# Patient Record
Sex: FEMALE | Race: WHITE | NOT HISPANIC OR LATINO | Employment: OTHER | URBAN - METROPOLITAN AREA
[De-identification: names, ages, dates, MRNs, and addresses within clinical notes are randomized per-mention and may not be internally consistent; named-entity substitution may affect disease eponyms.]

---

## 2017-01-09 RX ORDER — CLOPIDOGREL BISULFATE 75 MG/1
TABLET ORAL
Qty: 90 TABLET | Refills: 0 | Status: SHIPPED | OUTPATIENT
Start: 2017-01-09 | End: 2017-04-18 | Stop reason: SDUPTHER

## 2017-02-27 ENCOUNTER — PATIENT OUTREACH (OUTPATIENT)
Dept: ADMINISTRATIVE | Facility: HOSPITAL | Age: 77
End: 2017-02-27

## 2017-02-27 NOTE — PROGRESS NOTES
PRE-VISIT CHART REVIEW    Appointment Scheduled on 3/14/17    Department stratifications & guidelines reviewed:yes    Target Chronic Diagnosis: DM, HTN, obesity    Chronic Diagnosis Intervention Due: yes    Goals Updated:Yes    Health Maintenance Due   Topic Date Due    Fecal Occult Blood Test (FOBT)  11/05/2015    Eye Exam  10/05/2016    Mammogram  10/29/2016    Foot Exam  12/04/2016    Hemoglobin A1c  03/06/2017       Advanced Directives:   65 years of age or older?  Yes  Directive on file?  no                                      Pre-visit patient communication: 02/27/2017 MyChart/Letter    Studies or screenings scheduled pre-visit: yes    Educate patient on obesity (30.74)

## 2017-02-27 NOTE — LETTER
February 27, 2017    Linda Her  [address] [facility] [address]             [facility]  [address]  [facility] [address]  Phone: [phone] Dear Mrs. Her:    [facility] is committed to your overall health.  To help you get the most out of each of your visits, we will review your information to make sure you are up to date on all of your recommended tests and/or procedures.      Dr. Smitha Rivas has found that you may be due for your annual diabetic eye and foot exams, mammogram, and colon cancer screening (stool cards).     If you have had any of the above done at another facility, please bring the records or information with you so that your record at [facility] will be complete.    If you are currently taking medication, please bring it with you to your appointment for review.    Also, if you have any type of Advanced Directives, please bring them with you to your office visit so we may scan them into your chart.    If you have any questions or concerns, please don't hesitate to call.    Thank you for letting us care for you,  Cassi Styles LPN Clinical Care Coordinator  [facility] [place]  [phone]

## 2017-03-13 RX ORDER — AMIODARONE HYDROCHLORIDE 200 MG/1
TABLET ORAL
Qty: 60 TABLET | Refills: 0 | Status: SHIPPED | OUTPATIENT
Start: 2017-03-13 | End: 2017-03-14 | Stop reason: SDUPTHER

## 2017-03-14 ENCOUNTER — TELEPHONE (OUTPATIENT)
Dept: FAMILY MEDICINE | Facility: CLINIC | Age: 77
End: 2017-03-14

## 2017-03-14 ENCOUNTER — OFFICE VISIT (OUTPATIENT)
Dept: FAMILY MEDICINE | Facility: CLINIC | Age: 77
End: 2017-03-14
Payer: MEDICARE

## 2017-03-14 VITALS
HEART RATE: 67 BPM | OXYGEN SATURATION: 98 % | SYSTOLIC BLOOD PRESSURE: 136 MMHG | RESPIRATION RATE: 18 BRPM | DIASTOLIC BLOOD PRESSURE: 64 MMHG | TEMPERATURE: 98 F | WEIGHT: 160.5 LBS | BODY MASS INDEX: 30.3 KG/M2 | HEIGHT: 61 IN

## 2017-03-14 DIAGNOSIS — E11.69 HYPERLIPIDEMIA ASSOCIATED WITH TYPE 2 DIABETES MELLITUS: ICD-10-CM

## 2017-03-14 DIAGNOSIS — E11.8 TYPE 2 DIABETES MELLITUS WITH COMPLICATION, UNSPECIFIED LONG TERM INSULIN USE STATUS: Primary | ICD-10-CM

## 2017-03-14 DIAGNOSIS — E78.5 HYPERLIPIDEMIA ASSOCIATED WITH TYPE 2 DIABETES MELLITUS: ICD-10-CM

## 2017-03-14 DIAGNOSIS — I70.1 RENAL ARTERY STENOSIS: ICD-10-CM

## 2017-03-14 DIAGNOSIS — Z12.31 VISIT FOR SCREENING MAMMOGRAM: ICD-10-CM

## 2017-03-14 DIAGNOSIS — I15.2 HYPERTENSION ASSOCIATED WITH DIABETES: ICD-10-CM

## 2017-03-14 DIAGNOSIS — E03.4 HYPOTHYROIDISM DUE TO ACQUIRED ATROPHY OF THYROID: ICD-10-CM

## 2017-03-14 DIAGNOSIS — E11.59 HYPERTENSION ASSOCIATED WITH DIABETES: ICD-10-CM

## 2017-03-14 DIAGNOSIS — I65.23 CAROTID STENOSIS, BILATERAL: Primary | ICD-10-CM

## 2017-03-14 LAB
ALBUMIN SERPL BCP-MCNC: 3.9 G/DL
ALP SERPL-CCNC: 85 U/L
ALT SERPL W/O P-5'-P-CCNC: 11 U/L
ANION GAP SERPL CALC-SCNC: 12 MMOL/L
AST SERPL-CCNC: 16 U/L
BILIRUB SERPL-MCNC: 0.6 MG/DL
BUN SERPL-MCNC: 20 MG/DL
CALCIUM SERPL-MCNC: 9.7 MG/DL
CHLORIDE SERPL-SCNC: 106 MMOL/L
CHOLEST/HDLC SERPL: 3.7 {RATIO}
CO2 SERPL-SCNC: 24 MMOL/L
CREAT SERPL-MCNC: 0.9 MG/DL
EST. GFR  (AFRICAN AMERICAN): >60 ML/MIN/1.73 M^2
EST. GFR  (NON AFRICAN AMERICAN): >60 ML/MIN/1.73 M^2
GLUCOSE SERPL-MCNC: 119 MG/DL
HDL/CHOLESTEROL RATIO: 27.2 %
HDLC SERPL-MCNC: 162 MG/DL
HDLC SERPL-MCNC: 44 MG/DL
LDLC SERPL CALC-MCNC: 95.6 MG/DL
NONHDLC SERPL-MCNC: 118 MG/DL
POTASSIUM SERPL-SCNC: 4.1 MMOL/L
PROT SERPL-MCNC: 7.8 G/DL
SODIUM SERPL-SCNC: 142 MMOL/L
T4 FREE SERPL-MCNC: 1.89 NG/DL
TRIGL SERPL-MCNC: 112 MG/DL
TSH SERPL DL<=0.005 MIU/L-ACNC: 0.09 UIU/ML

## 2017-03-14 PROCEDURE — 80061 LIPID PANEL: CPT

## 2017-03-14 PROCEDURE — 84439 ASSAY OF FREE THYROXINE: CPT

## 2017-03-14 PROCEDURE — 99214 OFFICE O/P EST MOD 30 MIN: CPT | Mod: S$GLB,,, | Performed by: FAMILY MEDICINE

## 2017-03-14 PROCEDURE — 80053 COMPREHEN METABOLIC PANEL: CPT

## 2017-03-14 PROCEDURE — 83036 HEMOGLOBIN GLYCOSYLATED A1C: CPT

## 2017-03-14 PROCEDURE — 36415 COLL VENOUS BLD VENIPUNCTURE: CPT | Mod: S$GLB,,, | Performed by: FAMILY MEDICINE

## 2017-03-14 PROCEDURE — 84443 ASSAY THYROID STIM HORMONE: CPT

## 2017-03-14 NOTE — PROGRESS NOTES
Subjective:       Patient ID: Linda Her is a 76 y.o. female.    Chief Complaint: Follow-up    HPI   The patient is a 76-year-old who is here today for chronic follow-up.  She is accompanied today by her son.  She has not yet had her labs but would like to get them done today.  Today we discussed the followin)  diabetes.  Her fasting morning sugar readings are usually in the  range.  Her sugars have not been above 120.  She denies any hypoglycemic episodes and denies any polyuria, polydipsia or polyphagia  2)  hypertension.  She's currently taking lisinopril 20 once a day, Norvasc 5 mg once a day and metoprolol 25 mg twice a day.  She has not felt as if her blood pressure has been high or low.  She does not check her blood pressure outside of the office  3)  hypothyroidism.  She is doing well with her current dose of Synthroid.  She is due for a TSH.  Of note, she is on amiodarone   4)  hyperlipidemia.  She is doing with her Lipitor.  She is fasting today for labs  5)  PAF.  She denies any palpitations.  She's previously not been able to tell when she has been in A. fib.  She is taking Plavix and amiodarone and metoprolol    Review of Systems   Constitutional: Negative for appetite change, chills, diaphoresis, fatigue, fever and unexpected weight change.   HENT: Negative for congestion, ear pain, postnasal drip, rhinorrhea, sinus pressure, sneezing, sore throat and trouble swallowing.    Eyes: Negative for pain, discharge and visual disturbance.   Respiratory: Negative for cough, chest tightness, shortness of breath and wheezing.    Cardiovascular: Negative for chest pain, palpitations and leg swelling.   Gastrointestinal: Negative for abdominal distention, abdominal pain, blood in stool, constipation, diarrhea, nausea and vomiting.   Skin: Negative for rash.       Objective:      Physical Exam   Constitutional: She is oriented to person, place, and time. She appears well-developed and  well-nourished. No distress.   HENT:   Head: Normocephalic and atraumatic.   Right Ear: Hearing, tympanic membrane, external ear and ear canal normal.   Left Ear: Hearing, tympanic membrane, external ear and ear canal normal.   Nose: Nose normal.   Mouth/Throat: Oropharynx is clear and moist and mucous membranes are normal. No oral lesions. No oropharyngeal exudate, posterior oropharyngeal edema or posterior oropharyngeal erythema.   Eyes: Conjunctivae, EOM and lids are normal. Pupils are equal, round, and reactive to light. No scleral icterus.   Positive ptosis involving the left eye.  Positive strabismus involving the left eye   Neck: Normal range of motion. Neck supple. Carotid bruit is present (bilaterally). No thyroid mass and no thyromegaly present.   The right side of the neck reveals a well healed right-sided CEA incision   Cardiovascular: Normal rate and regular rhythm.   No extrasystoles are present. PMI is not displaced.  Exam reveals no gallop.    Murmur heard.   Systolic murmur is present with a grade of 4/6   Pulses:       Dorsalis pedis pulses are 2+ on the right side, and 2+ on the left side.        Posterior tibial pulses are 2+ on the right side, and 2+ on the left side.   Pulmonary/Chest: Effort normal and breath sounds normal. No accessory muscle usage. No respiratory distress.   Clear to auscultation bilaterally.   Abdominal: Soft. Normal appearance and bowel sounds are normal. She exhibits no abdominal bruit. There is no hepatosplenomegaly. There is no tenderness. There is no rebound.   Musculoskeletal:        Right foot: There is no deformity.        Left foot: There is no deformity.   Feet:   Right Foot:   Protective Sensation: 10 sites tested. 10 sites sensed.   Skin Integrity: Positive for warmth. Negative for ulcer or callus.   Left Foot:   Protective Sensation: 10 sites tested. 10 sites sensed.   Skin Integrity: Positive for warmth. Negative for ulcer or callus.   Lymphadenopathy:         "Head (right side): No submental and no submandibular adenopathy present.        Head (left side): No submental and no submandibular adenopathy present.        Right cervical: No superficial cervical, no deep cervical and no posterior cervical adenopathy present.       Left cervical: No superficial cervical, no deep cervical and no posterior cervical adenopathy present.        Right: No supraclavicular adenopathy present.        Left: No supraclavicular adenopathy present.   Neurological: She is alert and oriented to person, place, and time.   Skin: Skin is warm, dry and intact.   Psychiatric: She has a normal mood and affect.     Blood pressure 136/64, pulse 67, temperature 97.7 °F (36.5 °C), resp. rate 18, height 5' 1" (1.549 m), weight 72.8 kg (160 lb 7.9 oz), SpO2 98 %.Body mass index is 30.33 kg/(m^2).        A/P:  1)  diabetes.  Status unknown.  We will check an A1c today.  We will arrange diabetic eye exam  2)  hypertension.  Well-controlled.  Continue current medication.  Her blood pressure should be less than 139/89  3)  hypothyroidism.  Status unknown.  We will check a TSH today   4)  hyperlipidemia.  Status unknown.  We will check fasting labs today  5)  PAF.  Asymptomatic.  Continue current medication.  She appears due for follow up with cardiology in June and we will ask them to schedule this   6)  PAD.  Status unknown.  She appears to for vascular follow-up and I'll ask them to schedule this  7)  health maintenance issues.  We will schedule her mammogram    As long as she does well, I will see her back in 6 months or sooner if needed  "

## 2017-03-14 NOTE — MR AVS SNAPSHOT
[facility]  [address] [address] [address]  [facility] [address]  Phone: [phone]  Fax: [phone]                  Linda Her   3/14/2017 9:50 AM   Office Visit    Description:  Female : 1940   Provider:  Smitha Rivas MD   Department:  [facility]           Reason for Visit     Follow-up           Diagnoses this Visit        Comments    Type 2 diabetes mellitus with complication, unspecified long term insulin use status    -  Primary     Hypothyroidism due to acquired atrophy of thyroid         Hyperlipidemia associated with type 2 diabetes mellitus         Hypertension associated with diabetes         Visit for screening mammogram         Encounter for colorectal cancer screening         Abnormal laboratory test result                To Do List           Future Appointments        Provider Department Dept Phone    3/18/2017 8:45 AM [facility] [id number] [facility] [phone]    2017 11:05 AM LAB, [place] [facility] [phone]    2017 1:20 PM Sierra Gooden MD [facility] - Hematology [phone]    2017 10:30 AM Smitha Rivas MD [facility] [phone]      Goals (5 Years of Data)              Today    16    Blood Pressure < 140/90   136/64  136/64      BMI is less than 25           HEMOGLOBIN A1C < 7.0       6.5      [facility] On Call     [facility] Care [facility] -  Assistance  Registered nurses in the [facility] provide clinical advisement, health education, appointment booking, and other advisory services.  Call for this free service at [phone].             Medications           Message regarding Medications     Verify the changes and/or additions to your medication regime listed below are the same as discussed with your clinician today.  If any of these changes or additions are incorrect, please notify your healthcare provider.    "          Verify that the below list of medications is an accurate representation of the medications you are currently taking.  If none reported, the list may be blank. If incorrect, please contact your healthcare provider. Carry this list with you in case of emergency.           Current Medications     amiodarone (PACERONE) 200 MG Tab Take 200 mg by mouth. Take 1 tablet on Tues, Wed, Friday, Saturday, Sunday.    amlodipine (NORVASC) 5 MG tablet Take 1 tablet (5 mg total) by mouth every morning.    atorvastatin (LIPITOR) 40 MG tablet Take 1 tablet (40 mg total) by mouth every evening.    blood sugar diagnostic Strp Once a day CS    cholecalciferol, vitamin D3, (VITAMIN D3) 2,000 unit Cap Take 1 capsule by mouth once daily.    clopidogrel (PLAVIX) 75 mg tablet TAKE 1 TABLET(75 MG) BY MOUTH EVERY MORNING    docusate sodium (COLACE) 100 MG capsule Take 100 mg by mouth once daily.    ferrous sulfate 325 mg (65 mg iron) Tab tablet Take 1 tablet (325 mg total) by mouth once daily.    lancets (ACCU-CHEK SOFTCLIX LANCETS) Misc Once a day for cs    levothyroxine (SYNTHROID) 125 MCG tablet TAKE 1 TABLET(125 MCG) BY MOUTH EVERY DAY    lisinopril (PRINIVIL,ZESTRIL) 20 MG tablet Take 1 tablet (20 mg total) by mouth every morning.    metoprolol tartrate (LOPRESSOR) 25 MG tablet Take 1 tablet (25 mg total) by mouth 2 (two) times daily.    blood-glucose meter (FREESTYLE SYSTEM KIT) kit Use as instructed           Clinical Reference Information           Your Vitals Were     BP Pulse Temp Resp Height Weight    136/64 67 97.7 °F (36.5 °C) 18 5' 1" (1.549 m) 72.8 kg (160 lb 7.9 oz)    SpO2 BMI             98% 30.33 kg/m2         Blood Pressure          Most Recent Value    BP  136/64      Allergies as of 3/14/2017     No Known Drug Allergies      Immunizations Administered on Date of Encounter - 3/14/2017     None      Orders Placed During Today's Visit      Normal Orders This Visit    Comprehensive metabolic panel     Hemoglobin A1c  "    Lipid panel     TSH     Future Labs/Procedures Expected by Expires    Mammo Digital Screening Bilat with CAD  3/14/2017 5/15/2018      Language Assistance Services     ATTENTION: Language assistance services are available, free of charge. Please call [phone].      ATENCIÓN: Si habla aurelia, tiene a mondragon disposición servicios gratuitos de asistencia lingüística. Llame al [phone].     CHÚ Ý: N?u b?n nói Ti?ng Vi?t, có các d?ch v? h? tr? ngôn ng? mi?n phí dành cho b?n. G?i s? [phone].         [facility] complies with applicable Federal civil rights laws and does not discriminate on the basis of race, color, national origin, age, disability, or sex.

## 2017-03-14 NOTE — TELEPHONE ENCOUNTER
----- Message from Dorothy Nichols sent at 3/14/2017 10:42 AM CDT -----  Contact: pt   Missed call

## 2017-03-15 ENCOUNTER — TELEPHONE (OUTPATIENT)
Dept: FAMILY MEDICINE | Facility: CLINIC | Age: 77
End: 2017-03-15

## 2017-03-15 DIAGNOSIS — E03.4 HYPOTHYROIDISM DUE TO ACQUIRED ATROPHY OF THYROID: Primary | ICD-10-CM

## 2017-03-15 LAB
ESTIMATED AVG GLUCOSE: 140 MG/DL
HBA1C MFR BLD HPLC: 6.5 %

## 2017-03-15 RX ORDER — LEVOTHYROXINE SODIUM 112 UG/1
112 TABLET ORAL DAILY
Qty: 30 TABLET | Refills: 1 | Status: SHIPPED | OUTPATIENT
Start: 2017-03-15 | End: 2017-05-16 | Stop reason: SDUPTHER

## 2017-03-15 NOTE — TELEPHONE ENCOUNTER
Pls notify pt:    Overall labs look good except for thyroid which is too high    Let's decrease the synthroid to 112 and recheck in 6 weeks

## 2017-03-18 ENCOUNTER — HOSPITAL ENCOUNTER (OUTPATIENT)
Dept: RADIOLOGY | Facility: HOSPITAL | Age: 77
Discharge: HOME | End: 2017-03-18
Attending: FAMILY MEDICINE
Payer: MEDICARE

## 2017-03-18 DIAGNOSIS — Z12.31 VISIT FOR SCREENING MAMMOGRAM: ICD-10-CM

## 2017-03-18 PROCEDURE — 77067 SCR MAMMO BI INCL CAD: CPT | Mod: TC

## 2017-03-18 PROCEDURE — 77067 SCR MAMMO BI INCL CAD: CPT | Mod: 26,,, | Performed by: RADIOLOGY

## 2017-03-18 PROCEDURE — 77063 BREAST TOMOSYNTHESIS BI: CPT | Mod: 26,,, | Performed by: RADIOLOGY

## 2017-03-20 RX ORDER — ATORVASTATIN CALCIUM 40 MG/1
TABLET, FILM COATED ORAL
Qty: 90 TABLET | Refills: 1 | Status: SHIPPED | OUTPATIENT
Start: 2017-03-20 | End: 2017-09-14 | Stop reason: SDUPTHER

## 2017-03-28 ENCOUNTER — TELEPHONE (OUTPATIENT)
Dept: FAMILY MEDICINE | Facility: CLINIC | Age: 77
End: 2017-03-28

## 2017-03-28 DIAGNOSIS — D64.9 ANEMIA, UNSPECIFIED TYPE: Primary | ICD-10-CM

## 2017-03-28 NOTE — TELEPHONE ENCOUNTER
Pt dropped off hemoccult cards.  Do not see any orders for them from her visit on 3/14/17.  Pended, please assoc dx.

## 2017-03-29 ENCOUNTER — LAB VISIT (OUTPATIENT)
Dept: LAB | Facility: HOSPITAL | Age: 77
End: 2017-03-29
Attending: FAMILY MEDICINE
Payer: MEDICARE

## 2017-03-29 DIAGNOSIS — D64.9 ANEMIA, UNSPECIFIED TYPE: ICD-10-CM

## 2017-03-29 LAB
OB PNL STL: NEGATIVE

## 2017-03-29 PROCEDURE — 82272 OCCULT BLD FECES 1-3 TESTS: CPT | Mod: 91

## 2017-03-31 ENCOUNTER — OFFICE VISIT (OUTPATIENT)
Dept: OPTOMETRY | Facility: CLINIC | Age: 77
End: 2017-03-31
Payer: MEDICARE

## 2017-03-31 DIAGNOSIS — H52.03 HYPEROPIA WITH ASTIGMATISM AND PRESBYOPIA, BILATERAL: ICD-10-CM

## 2017-03-31 DIAGNOSIS — H25.13 NUCLEAR SCLEROSIS, BILATERAL: ICD-10-CM

## 2017-03-31 DIAGNOSIS — H43.393 VITREOUS FLOATERS, BILATERAL: ICD-10-CM

## 2017-03-31 DIAGNOSIS — H15.059 SCLEROMALACIA: ICD-10-CM

## 2017-03-31 DIAGNOSIS — Z13.5 GLAUCOMA SCREENING: ICD-10-CM

## 2017-03-31 DIAGNOSIS — H50.15 ALTERNATING EXOTROPIA: ICD-10-CM

## 2017-03-31 DIAGNOSIS — H52.203 HYPEROPIA WITH ASTIGMATISM AND PRESBYOPIA, BILATERAL: ICD-10-CM

## 2017-03-31 DIAGNOSIS — E11.9 DIABETES MELLITUS TYPE 2 WITHOUT RETINOPATHY: Primary | ICD-10-CM

## 2017-03-31 DIAGNOSIS — H40.033 ANATOMICAL NARROW ANGLE, BILATERAL: ICD-10-CM

## 2017-03-31 DIAGNOSIS — H52.4 HYPEROPIA WITH ASTIGMATISM AND PRESBYOPIA, BILATERAL: ICD-10-CM

## 2017-03-31 PROCEDURE — 99213 OFFICE O/P EST LOW 20 MIN: CPT | Mod: PBBFAC,PO | Performed by: OPTOMETRIST

## 2017-03-31 PROCEDURE — 99999 PR PBB SHADOW E&M-EST. PATIENT-LVL III: CPT | Mod: PBBFAC,,, | Performed by: OPTOMETRIST

## 2017-03-31 PROCEDURE — 92014 COMPRE OPH EXAM EST PT 1/>: CPT | Mod: S$PBB,,, | Performed by: OPTOMETRIST

## 2017-03-31 PROCEDURE — 92015 DETERMINE REFRACTIVE STATE: CPT | Mod: ,,, | Performed by: OPTOMETRIST

## 2017-03-31 NOTE — PATIENT INSTRUCTIONS
DIABETES AND THE EYE / DIABETIC RETINOPATHY    Diabetic retinopathy is a condition occurring in persons with diabetes, which causes progressive damage to the retina, the light sensitive lining at the back of the eye. It is a serious sight-threatening complication of diabetes.    Diabetic retinopathy is the result of damage to the tiny blood vessels that nourish the retina. They leak blood and other fluids that cause swelling of retinal tissue and clouding of vision. The condition usually affects both eyes. The longer a person has diabetes, the more likely they will develop diabetic retinopathy. If left untreated, diabetic retinopathy can cause blindness.  There are two basic types of diabetic retinopathy:    Background or nonproliferative diabetic retinopathy (NPDR)  Nonproliferative diabetic retinopathy (NPDR) is the earliest stage of diabetic retinopathy. With this condition, damaged blood vessels in the retina begin to leak extra fluid and small amounts of blood into the eye. Sometimes, deposits of cholesterol or other fats from the blood may leak into the retina. Many people with diabetes have mild NPDR, which usually does not affect their vision. However, if their vision is affected, it is the result of macular edema and macular ischemia.    If vision is affected due to macular changes, a consult with a Retina Specialist may be advised.  This is an ophthalmologist that treats retina conditions, including diabetic retinopathy.     Proliferative diabetic retinopathy (PDR)  Proliferative diabetic retinopathy (PDR) mainly occurs when many of the blood vessels in the retina close, preventing enough blood flow. In an attempt to supply blood to the area where the original vessels closed, the retina responds by growing new blood vessels. This is called neovascularization. However, these new blood vessels are abnormal and do not supply the retina with proper blood flow. The new vessels are also often accompanied by scar  tissue that may cause the retina to wrinkle or detach. PDR may cause more severe vision loss than NPDR because it can affect both central and peripheral vision.     A patient diagnosed with proliferative diabetic eye disease will be referred to a retinal specialist for consultation.    Often there are no visual symptoms in the early stages of diabetic retinopathy. That is why our eye care professionals recommend that everyone with diabetes have a comprehensive dilated eye examination once a year. Early detection and treatment can limit the potential for significant vision loss from diabetic retinopathy.        FLASHES / FLOATERS / POSTERIOR VITREOUS DETACHMENT    Call the clinic if you have any further changes in symptoms.  Including:  Increased numbers of floaters or flashing lights, dimness or darkness that moves through or stays constant in your vision, or any pain in the eye (s).            Early Cataracts--not visually significant for surgery consultation.    What Are Cataracts?  A clear lens in the eye focuses light. This lets the eye see images sharply. With age, the lens slowly becomes cloudy. The cloudy lens is a cataract. A cataract scatters light and makes it hard for the eye to focus. Cataracts often form in both eyes. But one lens may cloud faster than the other.      The Aging of Your Lens    Your lens may cloud so slowly that you don`t notice any vision changes at first. But as the cataract gets worse, the eye has a harder time focusing. In early stages, glasses may help you see better. As the lens gets cloudier, your doctor may recommend surgery to restore your vision.  NARROW ANTERIOR CHAMBER ANGLE    The anterior chamber angle is the measured distance from the back surface of the cornea (the clear surface of the eye), to the iris (the color part of the eye).  Fluids that are normally produced inside the eye drain out through this so called angle. If the chamber angle is very narrow, it can impede  this fluid outflow, and cause the eye pressure to rise.  This could lead to one type of glaucoma, Narrow Angle Glaucoma.    A test used to measure the angle depth is called GONIOSCOPY.  An instrument with a reflecting mirror is placed on the front of the eye to view the angle.     Very rarely, patients with narrow angle can have a sudden rise in eye pressure.  Symptoms of Acute Narrow Angle Glaucoma could include: intense ache/ pain, deep redness, cloudy / foggy/ blurred vision, headache and nausea.  Contact our office if you should ever experience any of these symptoms.

## 2017-03-31 NOTE — MR AVS SNAPSHOT
[facility]  [address]  [facility] [address]  Phone: [phone]  Fax: [phone]                  Linda Her   3/31/2017 3:30 PM   Office Visit    Description:  Female : 1940   Provider:  SHAZIA Muro, OD   Department:  [facility]           Reason for Visit     Annual Exam     Diabetic Eye Exam     Ptosis           Diagnoses this Visit        Comments    Diabetes mellitus type 2 without retinopathy    -  Primary     Nuclear sclerosis, bilateral         Alternating exotropia         Glaucoma screening         Vitreous floaters, bilateral         Anatomical narrow angle, bilateral         Scleromalacia         Hyperopia with astigmatism and presbyopia, bilateral                To Do List           Future Appointments        Provider Department Dept Phone    4/3/2017 12:30 PM AUDIO, [facility] Audiology [phone]    4/3/2017 1:20 PM Selina Lomax NP [facility] [phone]    2017 11:05 AM LAB, [place] [facility] [phone]    2017 12:00 PM LAB, [place] [facility] [phone]    2017 1:20 PM Sierra Gooden MD [facility] Hematology [phone]      Goals (5 Years of Data)              3/14/17    916    Blood Pressure < 140/90   136/64  136/64      BMI is less than 25           HEMOGLOBIN A1C < 7.0   6.5    6.5      Follow-Up and Disposition     Return in about 1 year (around 3/31/2018) for Annual Diabetic Eye Exam.      [facility]     [facility] -  Assistance  Unless otherwise directed by your provider, please contact [facility], our nurse care line that is available for  assistance.     Registered nurses in the [facility] provide: appointment scheduling, clinical advisement, health education, and other advisory services.  Call: [phone] (toll free)               Medications           Message regarding  Medications     Verify the changes and/or additions to your medication regime listed below are the same as discussed with your clinician today.  If any of these changes or additions are incorrect, please notify your healthcare provider.             Verify that the below list of medications is an accurate representation of the medications you are currently taking.  If none reported, the list may be blank. If incorrect, please contact your healthcare provider. Carry this list with you in case of emergency.           Current Medications     amiodarone (PACERONE) 200 MG Tab Take 200 mg by mouth. Take 1 tablet on Tues, Wed, Friday, Saturday, Sunday.    amlodipine (NORVASC) 5 MG tablet Take 1 tablet (5 mg total) by mouth every morning.    atorvastatin (LIPITOR) 40 MG tablet TAKE 1 TABLET(40 MG) BY MOUTH EVERY EVENING    blood sugar diagnostic Strp Once a day CS    blood-glucose meter (FREESTYLE SYSTEM KIT) kit Use as instructed    cholecalciferol, vitamin D3, (VITAMIN D3) 2,000 unit Cap Take 1 capsule by mouth once daily.    clopidogrel (PLAVIX) 75 mg tablet TAKE 1 TABLET(75 MG) BY MOUTH EVERY MORNING    docusate sodium (COLACE) 100 MG capsule Take 100 mg by mouth once daily.    ferrous sulfate 325 mg (65 mg iron) Tab tablet Take 1 tablet (325 mg total) by mouth once daily.    lancets (ACCU-CHEK SOFTCLIX LANCETS) Misc Once a day for cs    levothyroxine (SYNTHROID) 112 MCG tablet Take 1 tablet (112 mcg total) by mouth once daily.    lisinopril (PRINIVIL,ZESTRIL) 20 MG tablet Take 1 tablet (20 mg total) by mouth every morning.    metoprolol tartrate (LOPRESSOR) 25 MG tablet Take 1 tablet (25 mg total) by mouth 2 (two) times daily.           Clinical Reference Information           Allergies as of 3/31/2017     No Known Drug Allergies      Immunizations Administered on Date of Encounter - 3/31/2017     None      Instructions    DIABETES AND THE EYE / DIABETIC RETINOPATHY    Diabetic retinopathy is a condition occurring in  persons with diabetes, which causes progressive damage to the retina, the light sensitive lining at the back of the eye. It is a serious sight-threatening complication of diabetes.    Diabetic retinopathy is the result of damage to the tiny blood vessels that nourish the retina. They leak blood and other fluids that cause swelling of retinal tissue and clouding of vision. The condition usually affects both eyes. The longer a person has diabetes, the more likely they will develop diabetic retinopathy. If left untreated, diabetic retinopathy can cause blindness.  There are two basic types of diabetic retinopathy:    Background or nonproliferative diabetic retinopathy (NPDR)  Nonproliferative diabetic retinopathy (NPDR) is the earliest stage of diabetic retinopathy. With this condition, damaged blood vessels in the retina begin to leak extra fluid and small amounts of blood into the eye. Sometimes, deposits of cholesterol or other fats from the blood may leak into the retina. Many people with diabetes have mild NPDR, which usually does not affect their vision. However, if their vision is affected, it is the result of macular edema and macular ischemia.    If vision is affected due to macular changes, a consult with a Retina Specialist may be advised.  This is an ophthalmologist that treats retina conditions, including diabetic retinopathy.     Proliferative diabetic retinopathy (PDR)  Proliferative diabetic retinopathy (PDR) mainly occurs when many of the blood vessels in the retina close, preventing enough blood flow. In an attempt to supply blood to the area where the original vessels closed, the retina responds by growing new blood vessels. This is called neovascularization. However, these new blood vessels are abnormal and do not supply the retina with proper blood flow. The new vessels are also often accompanied by scar tissue that may cause the retina to wrinkle or detach. PDR may cause more severe vision loss  than NPDR because it can affect both central and peripheral vision.     A patient diagnosed with proliferative diabetic eye disease will be referred to a retinal specialist for consultation.    Often there are no visual symptoms in the early stages of diabetic retinopathy. That is why our eye care professionals recommend that everyone with diabetes have a comprehensive dilated eye examination once a year. Early detection and treatment can limit the potential for significant vision loss from diabetic retinopathy.        FLASHES / FLOATERS / POSTERIOR VITREOUS DETACHMENT    Call the clinic if you have any further changes in symptoms.  Including:  Increased numbers of floaters or flashing lights, dimness or darkness that moves through or stays constant in your vision, or any pain in the eye (s).            Early Cataracts--not visually significant for surgery consultation.    What Are Cataracts?  A clear lens in the eye focuses light. This lets the eye see images sharply. With age, the lens slowly becomes cloudy. The cloudy lens is a cataract. A cataract scatters light and makes it hard for the eye to focus. Cataracts often form in both eyes. But one lens may cloud faster than the other.      The Aging of Your Lens    Your lens may cloud so slowly that you don`t notice any vision changes at first. But as the cataract gets worse, the eye has a harder time focusing. In early stages, glasses may help you see better. As the lens gets cloudier, your doctor may recommend surgery to restore your vision.  NARROW ANTERIOR CHAMBER ANGLE    The anterior chamber angle is the measured distance from the back surface of the cornea (the clear surface of the eye), to the iris (the color part of the eye).  Fluids that are normally produced inside the eye drain out through this so called angle. If the chamber angle is very narrow, it can impede this fluid outflow, and cause the eye pressure to rise.  This could lead to one type of  glaucoma, Narrow Angle Glaucoma.    A test used to measure the angle depth is called GONIOSCOPY.  An instrument with a reflecting mirror is placed on the front of the eye to view the angle.     Very rarely, patients with narrow angle can have a sudden rise in eye pressure.  Symptoms of Acute Narrow Angle Glaucoma could include: intense ache/ pain, deep redness, cloudy / foggy/ blurred vision, headache and nausea.  Contact our office if you should ever experience any of these symptoms.         Language Assistance Services     ATTENTION: Language assistance services are available, free of charge. Please call [phone].      ATENCIÓN: Si dave moses, tiene a mondragon disposición servicios gratuitos de asistencia lingüística. Llame al [phone].     CHÚ Ý: N?u b?n nói Ti?ng Vi?t, có các d?ch v? h? tr? ngôn ng? mi?n phí dành cho b?n. G?i s? [phone].         [facility] complies with applicable Federal civil rights laws and does not discriminate on the basis of race, color, national origin, age, disability, or sex.

## 2017-03-31 NOTE — PROGRESS NOTES
HPI     Annual Exam    Additional comments: DLE 10-15 (patti)    ocular health exam           Diabetic Eye Exam    Additional comments: BSL controlled           Ptosis    Additional comments: TWILA           Comments   Hemoglobin A1C       Date                     Value               Ref Range             Status                03/14/2017               6.5 (H)             4.5 - 6.2 %           Final              Comment:    According to ADA guidelines, hemoglobin A1C <7.0% represents  optimal   control in non-pregnant diabetic patients.  Different  metrics may apply   to specific populations.   Standards of Medical Care in Diabetes -   2016.  For the purpose of screening for the presence of diabetes:  <5.7%       Consistent with the absence of diabetes  5.7-6.4%  Consistent with   increasing risk for diabetes   (prediabetes)  >or=6.5%  Consistent with   diabetes  Currently no consensus exists for use of hemoglobin A1C  for   diagnosis of diabetes for children.         09/06/2016               6.5 (H)             4.5 - 6.2 %           Final              Comment:    According to ADA guidelines, hemoglobin A1C <7.0% represents  optimal   control in non-pregnant diabetic patients.  Different  metrics may apply   to specific populations.   Standards of Medical Care in Diabetes -   2016.  For the purpose of screening for the presence of diabetes:  <5.7%       Consistent with the absence of diabetes  5.7-6.4%  Consistent with   increasing risk for diabetes   (prediabetes)  >or=6.5%  Consistent with   diabetes  Currently no consensus exists for use of hemoglobin A1C  for   diagnosis of diabetes for children.         04/22/2016               5.9                 4.5 - 6.2 %           Final            ----------         Last edited by Kelsi Frye on 3/31/2017  3:30 PM. (History)            Assessment /Plan     For exam results, see Encounter Report.    Diabetes mellitus type 2 without retinopathy    Nuclear sclerosis,  bilateral    Alternating exotropia    Glaucoma screening    Vitreous floaters, bilateral    Anatomical narrow angle, bilateral    Scleromalacia - Left Eye    Hyperopia with astigmatism and presbyopia, bilateral        1. No ret/ no csme, gave info control glucose, annual DFE  2. Vis sig, not ready for consult  3. Longstanding / stable--no amblyopia  4. Not suspect  5. RD precautions given  6. Shallow / plateau iris---open 3+ on previous gonio, monitor  Repeat gonio next exam  7. Stable   8. Updated specs rx, gave copy, fill prn     Discussed and educated patient on current findings /plan.  RTC 1 year, prn if any changes / issues

## 2017-04-03 ENCOUNTER — OFFICE VISIT (OUTPATIENT)
Dept: OTOLARYNGOLOGY | Facility: CLINIC | Age: 77
End: 2017-04-03
Payer: MEDICARE

## 2017-04-03 ENCOUNTER — CLINICAL SUPPORT (OUTPATIENT)
Dept: AUDIOLOGY | Facility: CLINIC | Age: 77
End: 2017-04-03
Payer: MEDICARE

## 2017-04-03 VITALS
BODY MASS INDEX: 29.55 KG/M2 | HEART RATE: 76 BPM | WEIGHT: 156.5 LBS | SYSTOLIC BLOOD PRESSURE: 119 MMHG | HEIGHT: 61 IN | DIASTOLIC BLOOD PRESSURE: 65 MMHG

## 2017-04-03 DIAGNOSIS — H91.93 BILATERAL HEARING LOSS, UNSPECIFIED HEARING LOSS TYPE: Primary | ICD-10-CM

## 2017-04-03 DIAGNOSIS — K08.109 EDENTULOUS: Primary | ICD-10-CM

## 2017-04-03 DIAGNOSIS — H90.3 BILATERAL HIGH FREQUENCY SENSORINEURAL HEARING LOSS: ICD-10-CM

## 2017-04-03 PROCEDURE — 99999 PR PBB SHADOW E&M-EST. PATIENT-LVL III: CPT | Mod: PBBFAC,,, | Performed by: NURSE PRACTITIONER

## 2017-04-03 PROCEDURE — 92557 COMPREHENSIVE HEARING TEST: CPT | Mod: PBBFAC,PO | Performed by: AUDIOLOGIST

## 2017-04-03 PROCEDURE — 92567 TYMPANOMETRY: CPT | Mod: PBBFAC,PO | Performed by: AUDIOLOGIST

## 2017-04-03 PROCEDURE — 99203 OFFICE O/P NEW LOW 30 MIN: CPT | Mod: S$PBB,,, | Performed by: NURSE PRACTITIONER

## 2017-04-03 NOTE — PROGRESS NOTES
Subjective:       Patient ID: Linda Her is a 76 y.o. female.    Chief Complaint: Hearing Loss    HPI   Patient states she does not have a problem hearing, but her daughter-in-law states otherwise. DIL states she must often ask the patient the same question 3 or 4 times before she responds. CALEB just wants to make sure she can in fact hear her.     Review of Systems   Constitutional: Negative.    HENT: Positive for hearing loss.    Eyes: Negative.    Respiratory: Negative.    Cardiovascular: Negative.    Gastrointestinal: Negative.    Musculoskeletal: Negative.    Skin: Negative.    Neurological: Negative.    Hematological: Negative.    Psychiatric/Behavioral: Negative.        Objective:      Physical Exam   Constitutional: She is oriented to person, place, and time. Vital signs are normal. She appears well-developed and well-nourished. She is cooperative. She does not appear ill. No distress.   HENT:   Head: Normocephalic and atraumatic.   Right Ear: Hearing, tympanic membrane, external ear and ear canal normal. Tympanic membrane is not erythematous. No middle ear effusion.   Left Ear: Hearing, tympanic membrane, external ear and ear canal normal. Tympanic membrane is not erythematous.  No middle ear effusion.   Nose: Nose normal. No mucosal edema or rhinorrhea. Right sinus exhibits no maxillary sinus tenderness and no frontal sinus tenderness. Left sinus exhibits no maxillary sinus tenderness and no frontal sinus tenderness.   Mouth/Throat: Uvula is midline, oropharynx is clear and moist and mucous membranes are normal. Mucous membranes are not pale, not dry and not cyanotic. No oral lesions. Abnormal dentition (edentulous). No oropharyngeal exudate, posterior oropharyngeal edema or posterior oropharyngeal erythema.   Eyes: Conjunctivae, EOM and lids are normal. Pupils are equal, round, and reactive to light. Right eye exhibits no discharge. Left eye exhibits no discharge. No scleral icterus.   Neck: Trachea  normal and normal range of motion. Neck supple. No tracheal deviation present. No thyroid mass and no thyromegaly present.   Cardiovascular: Normal rate.    Pulmonary/Chest: Effort normal. No stridor. No respiratory distress. She has no wheezes.   Musculoskeletal: Normal range of motion.   Lymphadenopathy:        Head (right side): No submental, no submandibular, no tonsillar, no preauricular and no posterior auricular adenopathy present.        Head (left side): No submental, no submandibular, no tonsillar, no preauricular and no posterior auricular adenopathy present.     She has no cervical adenopathy.        Right cervical: No superficial cervical and no posterior cervical adenopathy present.       Left cervical: No superficial cervical and no posterior cervical adenopathy present.   Neurological: She is alert and oriented to person, place, and time. She has normal strength. Coordination and gait normal.   Skin: Skin is warm, dry and intact. No lesion and no rash noted. She is not diaphoretic. No cyanosis. No pallor.   Psychiatric: She has a normal mood and affect. Her speech is normal and behavior is normal. Judgment and thought content normal. Cognition and memory are normal.   Nursing note and vitals reviewed.      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   Today's audiogram reveals the patient is a candidate for amplification. Audiogram is reviewed in detail with the patient. The audiologist's recommendation that the patient have amplification/hearing aids is discussed and questions answered. Patient has been given information by the audiologist on how to schedule a hearing aid consultation. Patient is encouraged to wear ear protection in loud noise and return annually for hearing test. Return to clinic as needed for further ENT concerns.

## 2017-04-08 RX ORDER — LEVOTHYROXINE SODIUM 125 UG/1
TABLET ORAL
Qty: 30 TABLET | Refills: 0 | OUTPATIENT
Start: 2017-04-08

## 2017-04-18 RX ORDER — CLOPIDOGREL BISULFATE 75 MG/1
TABLET ORAL
Qty: 90 TABLET | Refills: 0 | Status: SHIPPED | OUTPATIENT
Start: 2017-04-18 | End: 2017-06-20 | Stop reason: SDUPTHER

## 2017-04-21 ENCOUNTER — TELEPHONE (OUTPATIENT)
Dept: FAMILY MEDICINE | Facility: CLINIC | Age: 77
End: 2017-04-21

## 2017-04-21 ENCOUNTER — TELEPHONE (OUTPATIENT)
Dept: CARDIOLOGY | Facility: CLINIC | Age: 77
End: 2017-04-21

## 2017-04-21 NOTE — TELEPHONE ENCOUNTER
Called and spoke with patient about her appt, she thought she was due now for an appt but Dr Stewart said she's not due till June, so I scheduled her for May 25th.

## 2017-04-21 NOTE — TELEPHONE ENCOUNTER
----- Message from Tonie Solis sent at 4/21/2017  1:22 PM CDT -----  Contact: Patient  Linda, patient [phone], Returning nurse's call. Call transferred to POD. Please advise. Thanks.

## 2017-04-21 NOTE — TELEPHONE ENCOUNTER
Spoke with pt - states that she has already spoken with cardiologists office and needs no further assistance at this time.

## 2017-04-21 NOTE — TELEPHONE ENCOUNTER
Pt called stating that someone called her yesterday in regards to scheduling an appt with her cardiologist for Mon AM. Pt states she knows about the labs to be done Mon, 4/24/17 but will see her cardiologist  Mon as well at 10 AM. Please notify the pt and let her know if that is feasible. Thank you. CLC

## 2017-04-24 ENCOUNTER — LAB VISIT (OUTPATIENT)
Dept: LAB | Facility: HOSPITAL | Age: 77
End: 2017-04-24
Attending: INTERNAL MEDICINE
Payer: MEDICARE

## 2017-04-24 DIAGNOSIS — E53.8 B12 DEFICIENCY: ICD-10-CM

## 2017-04-24 DIAGNOSIS — D47.2 MGUS (MONOCLONAL GAMMOPATHY OF UNKNOWN SIGNIFICANCE): ICD-10-CM

## 2017-04-24 DIAGNOSIS — D50.9 ANEMIA, IRON DEFICIENCY: ICD-10-CM

## 2017-04-24 DIAGNOSIS — E03.4 HYPOTHYROIDISM DUE TO ACQUIRED ATROPHY OF THYROID: ICD-10-CM

## 2017-04-24 LAB
ALBUMIN SERPL BCP-MCNC: 3.9 G/DL
ALP SERPL-CCNC: 105 U/L
ALT SERPL W/O P-5'-P-CCNC: 15 U/L
ANION GAP SERPL CALC-SCNC: 11 MMOL/L
AST SERPL-CCNC: 14 U/L
BASOPHILS # BLD AUTO: 0.07 K/UL
BASOPHILS NFR BLD: 1.1 %
BILIRUB SERPL-MCNC: 0.4 MG/DL
BUN SERPL-MCNC: 23 MG/DL
CALCIUM SERPL-MCNC: 9.5 MG/DL
CHLORIDE SERPL-SCNC: 104 MMOL/L
CO2 SERPL-SCNC: 27 MMOL/L
CREAT SERPL-MCNC: 1.1 MG/DL
DIFFERENTIAL METHOD: ABNORMAL
EOSINOPHIL # BLD AUTO: 0.3 K/UL
EOSINOPHIL NFR BLD: 5.3 %
ERYTHROCYTE [DISTWIDTH] IN BLOOD BY AUTOMATED COUNT: 16 %
EST. GFR  (AFRICAN AMERICAN): 57 ML/MIN/1.73 M^2
EST. GFR  (NON AFRICAN AMERICAN): 50 ML/MIN/1.73 M^2
FERRITIN SERPL-MCNC: 195 NG/ML
GLUCOSE SERPL-MCNC: 138 MG/DL
HCT VFR BLD AUTO: 38.2 %
HGB BLD-MCNC: 12.6 G/DL
IRON SERPL-MCNC: 67 UG/DL
LYMPHOCYTES # BLD AUTO: 1.8 K/UL
LYMPHOCYTES NFR BLD: 28.5 %
MCH RBC QN AUTO: 28.7 PG
MCHC RBC AUTO-ENTMCNC: 33 %
MCV RBC AUTO: 87 FL
MONOCYTES # BLD AUTO: 0.7 K/UL
MONOCYTES NFR BLD: 10.4 %
NEUTROPHILS # BLD AUTO: 3.5 K/UL
NEUTROPHILS NFR BLD: 54.7 %
PLATELET # BLD AUTO: 267 K/UL
PMV BLD AUTO: 10.3 FL
POTASSIUM SERPL-SCNC: 4.5 MMOL/L
PROT SERPL-MCNC: 8 G/DL
RBC # BLD AUTO: 4.39 M/UL
SATURATED IRON: 19 %
SODIUM SERPL-SCNC: 142 MMOL/L
TOTAL IRON BINDING CAPACITY: 357 UG/DL
TRANSFERRIN SERPL-MCNC: 241 MG/DL
TSH SERPL DL<=0.005 MIU/L-ACNC: 3.37 UIU/ML
VIT B12 SERPL-MCNC: 310 PG/ML
WBC # BLD AUTO: 6.42 K/UL

## 2017-04-24 PROCEDURE — 83540 ASSAY OF IRON: CPT

## 2017-04-24 PROCEDURE — 80053 COMPREHEN METABOLIC PANEL: CPT | Mod: PO

## 2017-04-24 PROCEDURE — 85025 COMPLETE CBC W/AUTO DIFF WBC: CPT | Mod: PO

## 2017-04-24 PROCEDURE — 36415 COLL VENOUS BLD VENIPUNCTURE: CPT | Mod: PO

## 2017-04-24 PROCEDURE — 84443 ASSAY THYROID STIM HORMONE: CPT

## 2017-04-24 PROCEDURE — 82607 VITAMIN B-12: CPT

## 2017-04-24 PROCEDURE — 82728 ASSAY OF FERRITIN: CPT

## 2017-04-25 ENCOUNTER — TELEPHONE (OUTPATIENT)
Dept: VASCULAR SURGERY | Facility: CLINIC | Age: 77
End: 2017-04-25

## 2017-04-25 DIAGNOSIS — I70.1 RENAL ARTERY STENOSIS: Primary | ICD-10-CM

## 2017-04-25 DIAGNOSIS — I65.23 BILATERAL CAROTID ARTERY STENOSIS: ICD-10-CM

## 2017-04-25 LAB — STFR SERPL-MCNC: 3.7 MG/L

## 2017-04-25 NOTE — TELEPHONE ENCOUNTER
Spoke with Daughter-in-law (per pt request) Re: f/u carotid and renal artery ultrasounds as ordered by Dr. Leon. States she would like to wait until after seeing Cardio ( Dr. Hernandez) in May. Informed that orders are in system and can be scheduled at any time. Pt does not need f/u appt with Dr. Leon unless results are abnormal. Will call pt after studies done and reviewed.

## 2017-04-26 DIAGNOSIS — E11.8 TYPE 2 DIABETES MELLITUS WITH COMPLICATION, UNSPECIFIED LONG TERM INSULIN USE STATUS: Primary | ICD-10-CM

## 2017-04-27 RX ORDER — LISINOPRIL 20 MG/1
TABLET ORAL
Qty: 90 TABLET | Refills: 1 | Status: SHIPPED | OUTPATIENT
Start: 2017-04-27 | End: 2017-09-14 | Stop reason: SDUPTHER

## 2017-04-27 NOTE — TELEPHONE ENCOUNTER
Pls notify pt:    Thyroid level is great!  CPM and let's recheck fasting labs prior to our next visit - orders in

## 2017-04-28 ENCOUNTER — OFFICE VISIT (OUTPATIENT)
Dept: HEMATOLOGY/ONCOLOGY | Facility: CLINIC | Age: 77
End: 2017-04-28
Payer: MEDICARE

## 2017-04-28 VITALS
RESPIRATION RATE: 17 BRPM | SYSTOLIC BLOOD PRESSURE: 111 MMHG | WEIGHT: 162.25 LBS | BODY MASS INDEX: 30.63 KG/M2 | HEIGHT: 61 IN | DIASTOLIC BLOOD PRESSURE: 61 MMHG | HEART RATE: 61 BPM

## 2017-04-28 DIAGNOSIS — D47.2 MGUS (MONOCLONAL GAMMOPATHY OF UNKNOWN SIGNIFICANCE): Primary | ICD-10-CM

## 2017-04-28 DIAGNOSIS — E03.9 ACQUIRED HYPOTHYROIDISM: ICD-10-CM

## 2017-04-28 DIAGNOSIS — I35.0 AORTIC VALVE STENOSIS, UNSPECIFIED ETIOLOGY: ICD-10-CM

## 2017-04-28 DIAGNOSIS — I27.20 PULMONARY HYPERTENSION: ICD-10-CM

## 2017-04-28 DIAGNOSIS — I10 ESSENTIAL HYPERTENSION: ICD-10-CM

## 2017-04-28 DIAGNOSIS — E78.00 HYPERCHOLESTEREMIA: ICD-10-CM

## 2017-04-28 DIAGNOSIS — I48.0 PAF (PAROXYSMAL ATRIAL FIBRILLATION): Chronic | ICD-10-CM

## 2017-04-28 DIAGNOSIS — E08.41 DIABETIC MONONEUROPATHY ASSOCIATED WITH DIABETES MELLITUS DUE TO UNDERLYING CONDITION: ICD-10-CM

## 2017-04-28 DIAGNOSIS — E78.2 MIXED HYPERLIPIDEMIA: ICD-10-CM

## 2017-04-28 PROCEDURE — 99999 PR PBB SHADOW E&M-EST. PATIENT-LVL III: CPT | Mod: PBBFAC,,, | Performed by: INTERNAL MEDICINE

## 2017-04-28 PROCEDURE — 99214 OFFICE O/P EST MOD 30 MIN: CPT | Mod: S$PBB,,, | Performed by: INTERNAL MEDICINE

## 2017-04-28 PROCEDURE — 99213 OFFICE O/P EST LOW 20 MIN: CPT | Mod: PBBFAC,PN | Performed by: INTERNAL MEDICINE

## 2017-04-28 RX ORDER — LISINOPRIL 20 MG/1
TABLET ORAL
COMMUNITY
Start: 2017-04-27 | End: 2017-04-28 | Stop reason: SDUPTHER

## 2017-04-28 NOTE — PROGRESS NOTES
A 75-year-old  woman coming in for followup.  The patient had mild iron   deficiency, slight B12 deficiency and monoclonal gammopathy.  Last year pt had a colostomy for bowel obstruction. Did well,   Also sees DR. Ferguson for carotid and CAD.      14 point review : no issues        PHYSICAL EXAM:  Elderly woman,   Ambulating to clinic  GENERAL:  Comfortable looking patient.  Patient is in no distress.  Awake, alert   and oriented to time, person and place.  No anxiety or agitation.    HEENT:  Normal conjunctivae and eyelids.  WNL.  PERRLA 3 to 4 mm.  No icterus,   no pallor, no congestion, and no discharge noted.     NECK:  Supple.  Trachea is central.  No crepitus.  No JVD or masses.    RESPIRATORY:  No intercostal retractions.  No dullness to percussion.  Chest is   clear to auscultation.  No rales, rhonchi or wheezes.  No crepitus.  Good air   entry bilaterally.    CARDIOVASCULAR:  S1 and S2 are normally heard without murmurs or gallops.  All   peripheral pulses are present.    ABDOMEN:  Her colostomy site is well healed.  No hepatosplenomegaly.  No free   fluid.  Bowel sounds are present.  No hernia noted. No masses.  No rebound or   tenderness.  No guarding or rigidity.  Umbilicus is midline.    LYMPHATICS:  No axillary, cervical, supraclavicular, submental, or inguinal   lymphadenopathy.    SKIN AND MUSCULOSKELETAL:  There is no evidence of excoriation marks or   ecchymosis.  No rashes.  No cyanosis.  No clubbing.  No joint or skeletal   deformities noted.  Normal range of motion.    NEUROLOGIC:  Higher functions are appropriate.  No cranial nerve deficits.    Normal gait.  Normal strength.  Motor and sensory functions are normal.  Deep   tendon reflexes are normal.    GENITAL AND RECTAL:  Exams are deferred.    LABS:  Complete normalization of CBC.  CMP is acceptable and within normal   range.  The patient's monoclonal gammopathy shows mild increase in protein   grams, but no obvious change from  before.  Spep. Slightly increased gamnmaglobulinemia       PLAN: anemia resolved,   MGUS Stable.   Continue f/u in 1 year with CBC, CMP, Iron studies, B12 and folate, SPEP and ROXIE   cont cards for cad   cont renal for ckd  pts blood sugars fluctuating , cont close mx and diet control   also cont cholesterola dn htn mx

## 2017-04-28 NOTE — MR AVS SNAPSHOT
[facility]  [facility]  [facility] [place] [address]  Phone: [phone]  Fax: [phone]                  Linda Her   2017 1:20 PM   Office Visit    Description:  Female : 1940   Provider:  Sierra Gooden MD   Department:  [facility]                To Do List           Future Appointments        Provider Department Dept Phone    2017 1:00 PM Raj Hernandez MD [facility] [phone]    2017 8:00 AM LAB, [place] [facility] [phone]    2017 10:30 AM Smitha Rivas MD [facility] Medicine [phone]      Goals (5 Years of Data)              Today    4/3/17    3/14/17    Blood Pressure < 140/90   111/61  111/61  111/61    BMI is less than 25           HEMOGLOBIN A1C < 7.0       6.5      [facility]     [facility] -  Assistance  Unless otherwise directed by your provider, please contact [facility], our nurse care line that is available for  assistance.     Registered nurses in the [facility] provide: appointment scheduling, clinical advisement, health education, and other advisory services.  Call: [phone] (toll free)               Medications           Message regarding Medications     Verify the changes and/or additions to your medication regime listed below are the same as discussed with your clinician today.  If any of these changes or additions are incorrect, please notify your healthcare provider.             Verify that the below list of medications is an accurate representation of the medications you are currently taking.  If none reported, the list may be blank. If incorrect, please contact your healthcare provider. Carry this list with you in case of emergency.           Current Medications     amiodarone (PACERONE) 200 MG Tab Take 200 mg by mouth. Take 1 tablet on Tues, Wed, Friday, Saturday, .    amlodipine  "(NORVASC) 5 MG tablet Take 1 tablet (5 mg total) by mouth every morning.    blood sugar diagnostic Strp Once a day CS    cholecalciferol, vitamin D3, (VITAMIN D3) 2,000 unit Cap Take 1 capsule by mouth once daily.    clopidogrel (PLAVIX) 75 mg tablet TAKE 1 TABLET(75 MG) BY MOUTH EVERY MORNING    docusate sodium (COLACE) 100 MG capsule Take 100 mg by mouth once daily.    ferrous sulfate 325 mg (65 mg iron) Tab tablet Take 1 tablet (325 mg total) by mouth once daily.    lancets (ACCU-CHEK SOFTCLIX LANCETS) Misc Once a day for cs    levothyroxine (SYNTHROID) 112 MCG tablet Take 1 tablet (112 mcg total) by mouth once daily.    lisinopril (PRINIVIL,ZESTRIL) 20 MG tablet TAKE 1 TABLET(20 MG) BY MOUTH EVERY MORNING    metoprolol tartrate (LOPRESSOR) 25 MG tablet Take 1 tablet (25 mg total) by mouth 2 (two) times daily.    atorvastatin (LIPITOR) 40 MG tablet TAKE 1 TABLET(40 MG) BY MOUTH EVERY EVENING    blood-glucose meter (FREESTYLE SYSTEM KIT) kit Use as instructed           Clinical Reference Information           Your Vitals Were     BP Pulse Resp Height Weight BMI    111/61 61 17 5' 1" (1.549 m) 73.6 kg (162 lb 4.1 oz) 30.66 kg/m2      Blood Pressure          Most Recent Value    BP  111/61      Allergies as of 4/28/2017     No Known Drug Allergies      Immunizations Administered on Date of Encounter - 4/28/2017     None      Language Assistance Services     ATTENTION: Language assistance services are available, free of charge. Please call [phone].      ATENCIÓN: Si habla español, tiene a mondragon disposición servicios gratuitos de asistencia lingüística. Llame al [phone].     [facility] Ý: N?u b?n nói Ti?ng Vi?t, có các d?ch v? h? tr? ngôn ng? mi?n phí dành cho b?n. G?i s? [phone].         [facility] complies with applicable Federal civil rights laws and does not discriminate on the basis of race, color, national origin, age, disability, or sex.        "

## 2017-05-16 RX ORDER — METOPROLOL TARTRATE 25 MG/1
TABLET, FILM COATED ORAL
Qty: 180 TABLET | Refills: 0 | Status: SHIPPED | OUTPATIENT
Start: 2017-05-16 | End: 2017-06-16 | Stop reason: SDUPTHER

## 2017-05-16 RX ORDER — AMLODIPINE BESYLATE 5 MG/1
TABLET ORAL
Qty: 90 TABLET | Refills: 0 | Status: SHIPPED | OUTPATIENT
Start: 2017-05-16 | End: 2017-06-16 | Stop reason: SDUPTHER

## 2017-05-16 RX ORDER — LEVOTHYROXINE SODIUM 112 UG/1
TABLET ORAL
Qty: 90 TABLET | Refills: 0 | Status: SHIPPED | OUTPATIENT
Start: 2017-05-16 | End: 2017-08-25 | Stop reason: SDUPTHER

## 2017-05-18 RX ORDER — AMIODARONE HYDROCHLORIDE 200 MG/1
TABLET ORAL
Qty: 60 TABLET | Refills: 0 | Status: SHIPPED | OUTPATIENT
Start: 2017-05-18 | End: 2017-05-25 | Stop reason: SDUPTHER

## 2017-05-25 ENCOUNTER — OFFICE VISIT (OUTPATIENT)
Dept: CARDIOLOGY | Facility: CLINIC | Age: 77
End: 2017-05-25
Payer: MEDICARE

## 2017-05-25 VITALS
HEART RATE: 68 BPM | DIASTOLIC BLOOD PRESSURE: 78 MMHG | HEIGHT: 61 IN | SYSTOLIC BLOOD PRESSURE: 136 MMHG | BODY MASS INDEX: 31.05 KG/M2 | WEIGHT: 164.44 LBS

## 2017-05-25 DIAGNOSIS — I15.0 RENOVASCULAR HYPERTENSION: Chronic | ICD-10-CM

## 2017-05-25 DIAGNOSIS — I48.0 PAF (PAROXYSMAL ATRIAL FIBRILLATION): Primary | Chronic | ICD-10-CM

## 2017-05-25 DIAGNOSIS — I35.0 AORTIC VALVE STENOSIS, UNSPECIFIED ETIOLOGY: ICD-10-CM

## 2017-05-25 DIAGNOSIS — I77.1 ILIAC ARTERY STENOSIS, BILATERAL: ICD-10-CM

## 2017-05-25 DIAGNOSIS — I10 ESSENTIAL HYPERTENSION: ICD-10-CM

## 2017-05-25 PROCEDURE — 99214 OFFICE O/P EST MOD 30 MIN: CPT | Mod: S$PBB,,, | Performed by: INTERNAL MEDICINE

## 2017-05-25 PROCEDURE — 99999 PR PBB SHADOW E&M-EST. PATIENT-LVL II: CPT | Mod: PBBFAC,,, | Performed by: INTERNAL MEDICINE

## 2017-05-25 PROCEDURE — 99212 OFFICE O/P EST SF 10 MIN: CPT | Mod: PBBFAC,PO | Performed by: INTERNAL MEDICINE

## 2017-05-25 PROCEDURE — 93010 ELECTROCARDIOGRAM REPORT: CPT | Mod: S$PBB,,, | Performed by: INTERNAL MEDICINE

## 2017-05-25 PROCEDURE — 93005 ELECTROCARDIOGRAM TRACING: CPT | Mod: PBBFAC,PO | Performed by: INTERNAL MEDICINE

## 2017-05-25 NOTE — PROGRESS NOTES
Subjective:    Patient ID:  Linda Her is a 76 y.o. female who presents for evaluation of Atrial Fibrillation (Routine f/u ) and Shortness of Breath      HPI 75 yo WF with mild AS, HTN, carotid artery disease and HLD. She carries the dx of atrial fib but she was not aware of it. Current rhythm sinus. Patient has chronic SOB but is very inactive. No CP. No neurologic symptoms. Denies palpitations, weak spells, and syncope    CONCLUSIONS     1 - Normal left ventricular systolic function (EF 60-65%).     2 - Left ventricular diastolic dysfunction.     3 - Trivial to mild mitral regurgitation.     4-- Mild AS        This document has been electronically    SIGNED BY: Raj Hernandez MD On: 09/14/2016 08:41    Normal ABIs. Normal flows. Normal study.      This document has been electronically    SIGNED BY: Benito Salinas MD On: 04/02/2015 13:41    CONCLUSIONS   There is 50 - 59% right Internal Carotid stenosis.  There is 70 - 79% left Internal Carotid stenosis.      This document has been electronically    SIGNED BY: Benito Salinas MD On: 03/31/2015 12:57      Impression       1.  Small left renal cysts.  2.  No Doppler evidence of hemodynamically significant stenosis.  3.  No significant change since the prior study of 11/9/12.       Review of Systems   Constitution: Positive for malaise/fatigue. Negative for decreased appetite, fever, weakness, weight gain and weight loss.   HENT: Negative for headaches, hearing loss and nosebleeds.    Eyes: Negative for visual disturbance.   Cardiovascular: Positive for dyspnea on exertion. Negative for chest pain, claudication, cyanosis, irregular heartbeat, leg swelling, near-syncope, orthopnea, palpitations, paroxysmal nocturnal dyspnea and syncope.   Respiratory: Positive for shortness of breath. Negative for cough, hemoptysis, sleep disturbances due to breathing, snoring and wheezing.    Endocrine: Negative for cold intolerance, heat intolerance, polydipsia and  "polyuria.   Hematologic/Lymphatic: Negative for adenopathy and bleeding problem. Does not bruise/bleed easily.   Skin: Negative for color change, itching, poor wound healing, rash and suspicious lesions.   Musculoskeletal: Negative for arthritis, back pain, falls, joint pain, joint swelling, muscle cramps, muscle weakness and myalgias.   Gastrointestinal: Negative for bloating, abdominal pain, change in bowel habit, constipation, flatus, heartburn, hematemesis, hematochezia, hemorrhoids, jaundice, melena, nausea and vomiting.   Genitourinary: Negative for bladder incontinence, decreased libido, frequency, hematuria, hesitancy and urgency.   Neurological: Negative for brief paralysis, difficulty with concentration, excessive daytime sleepiness, dizziness, focal weakness, light-headedness, loss of balance, numbness and vertigo.   Psychiatric/Behavioral: Negative for altered mental status, depression and memory loss. The patient does not have insomnia and is not nervous/anxious.    Allergic/Immunologic: Negative for environmental allergies, hives and persistent infections.        Objective:    Physical Exam   Constitutional: She is oriented to person, place, and time. She appears well-developed and well-nourished.   /78 (Patient Position: Sitting, BP Method: Automatic)   Pulse 68   Ht 5' 1" (1.549 m)   Wt 74.6 kg (164 lb 7.4 oz)   BMI 31.08 kg/m²      HENT:   Head: Normocephalic and atraumatic.   Right Ear: External ear normal.   Left Ear: External ear normal.   Nose: Nose normal.   Mouth/Throat: Oropharynx is clear and moist.   Eyes: Conjunctivae, EOM and lids are normal. Pupils are equal, round, and reactive to light. Right eye exhibits no discharge. Left eye exhibits no discharge. Right conjunctiva has no hemorrhage. No scleral icterus.   Neck: Normal range of motion. Neck supple. No JVD present. No tracheal deviation present. No thyromegaly present.   Cardiovascular: Normal rate, regular rhythm and intact " distal pulses.  Exam reveals no gallop and no friction rub.    Murmur heard.   Harsh midsystolic murmur is present with a grade of 2/6  at the upper right sternal border radiating to the neck  Pulses:       Carotid pulses are 2+ on the right side with bruit, and 2+ on the left side with bruit.       Posterior tibial pulses are 2+ on the right side, and 2+ on the left side.   Pulmonary/Chest: Effort normal and breath sounds normal. No respiratory distress. She has no wheezes. She has no rales. She exhibits no tenderness. Breasts are symmetrical.   Abdominal: Soft. Bowel sounds are normal. She exhibits no distension and no mass. There is no hepatosplenomegaly or hepatomegaly. There is no tenderness. There is no rebound and no guarding.   Musculoskeletal: Normal range of motion. She exhibits no edema or tenderness.   Lymphadenopathy:     She has no cervical adenopathy.   Neurological: She is alert and oriented to person, place, and time. She displays normal reflexes. No cranial nerve deficit. Coordination normal.   Skin: Skin is warm and dry. No rash noted. No erythema. No pallor.   Psychiatric: She has a normal mood and affect. Her behavior is normal. Judgment and thought content normal.   Nursing note and vitals reviewed.        Assessment:       1. PAF (paroxysmal atrial fibrillation)    2.  hypertension    3. Aortic valve stenosis, unspecified etiology    4. HLD   5.  Deconditioning        Plan:     Reduce amio to 100mg daily      Orders Placed This Encounter   Procedures    EKG 12-lead     Carotid dopplers  Return in about 6 months (around 11/25/2017).

## 2017-05-25 NOTE — LETTER
May 25, 2017      Raj Hernandez MD  [address] [facility] [address] [place] [address]           [facility]  [address] [facility] [address] [place] [address]  Phone: [phone]          Patient: Linda Her   MR Number: [id number]   YOB: 1940   Date of Visit: 5/25/2017       Dear Dr. Raj Hernandez:    Thank you for referring Linda Her to me for evaluation. Attached you will find relevant portions of my assessment and plan of care.    If you have questions, please do not hesitate to call me. I look forward to following Linda Her along with you.    Sincerely,    Suhas Ontiveros Jr., MD    Enclosure  CC:  No Recipients    If you would like to receive this communication electronically, please contact [email or web address] or [phone] to request more information on [employer] Link access.    For providers and/or their staff who would like to refer a patient to [facility], please contact us through our one-stop-shop provider referral line, [facility], at [phone].    If you feel you have received this communication in error or would no longer like to receive these types of communications, please e-mail [email or web address]

## 2017-06-01 ENCOUNTER — CLINICAL SUPPORT (OUTPATIENT)
Dept: CARDIOLOGY | Facility: CLINIC | Age: 77
End: 2017-06-01
Payer: MEDICARE

## 2017-06-01 DIAGNOSIS — I65.23 BILATERAL CAROTID ARTERY STENOSIS: ICD-10-CM

## 2017-06-01 LAB — INTERNAL CAROTID STENOSIS: ABNORMAL

## 2017-06-01 PROCEDURE — 93880 EXTRACRANIAL BILAT STUDY: CPT | Mod: PBBFAC,PO | Performed by: INTERNAL MEDICINE

## 2017-06-02 ENCOUNTER — TELEPHONE (OUTPATIENT)
Dept: CARDIOLOGY | Facility: CLINIC | Age: 77
End: 2017-06-02

## 2017-06-02 NOTE — TELEPHONE ENCOUNTER
Discussed results with patient. The current medical regimen is effective;  continue present plan and medications.

## 2017-06-16 RX ORDER — AMLODIPINE BESYLATE 5 MG/1
TABLET ORAL
Qty: 90 TABLET | Refills: 1 | Status: SHIPPED | OUTPATIENT
Start: 2017-06-16 | End: 2017-09-14

## 2017-06-16 RX ORDER — METOPROLOL TARTRATE 25 MG/1
TABLET, FILM COATED ORAL
Qty: 180 TABLET | Refills: 1 | Status: SHIPPED | OUTPATIENT
Start: 2017-06-16 | End: 2017-09-14 | Stop reason: SDUPTHER

## 2017-06-21 RX ORDER — CLOPIDOGREL BISULFATE 75 MG/1
TABLET ORAL
Qty: 90 TABLET | Refills: 0 | Status: SHIPPED | OUTPATIENT
Start: 2017-06-21 | End: 2017-09-14 | Stop reason: SDUPTHER

## 2017-08-21 ENCOUNTER — LAB VISIT (OUTPATIENT)
Dept: LAB | Facility: HOSPITAL | Age: 77
End: 2017-08-21
Attending: FAMILY MEDICINE
Payer: MEDICARE

## 2017-08-21 DIAGNOSIS — E11.8 TYPE 2 DIABETES MELLITUS WITH COMPLICATION, UNSPECIFIED LONG TERM INSULIN USE STATUS: ICD-10-CM

## 2017-08-21 LAB
ALBUMIN SERPL BCP-MCNC: 3.5 G/DL
ALP SERPL-CCNC: 102 U/L
ALT SERPL W/O P-5'-P-CCNC: 13 U/L
ANION GAP SERPL CALC-SCNC: 11 MMOL/L
AST SERPL-CCNC: 15 U/L
BILIRUB SERPL-MCNC: 0.6 MG/DL
BUN SERPL-MCNC: 19 MG/DL
CALCIUM SERPL-MCNC: 9.7 MG/DL
CHLORIDE SERPL-SCNC: 107 MMOL/L
CHOLEST/HDLC SERPL: 3.4 {RATIO}
CO2 SERPL-SCNC: 25 MMOL/L
CREAT SERPL-MCNC: 1.1 MG/DL
EST. GFR  (AFRICAN AMERICAN): 56.8 ML/MIN/1.73 M^2
EST. GFR  (NON AFRICAN AMERICAN): 49.2 ML/MIN/1.73 M^2
ESTIMATED AVG GLUCOSE: 148 MG/DL
GLUCOSE SERPL-MCNC: 139 MG/DL
HBA1C MFR BLD HPLC: 6.8 %
HDL/CHOLESTEROL RATIO: 29.4 %
HDLC SERPL-MCNC: 170 MG/DL
HDLC SERPL-MCNC: 50 MG/DL
LDLC SERPL CALC-MCNC: 94.8 MG/DL
NONHDLC SERPL-MCNC: 120 MG/DL
POTASSIUM SERPL-SCNC: 4.1 MMOL/L
PROT SERPL-MCNC: 7.9 G/DL
SODIUM SERPL-SCNC: 143 MMOL/L
TRIGL SERPL-MCNC: 126 MG/DL
TSH SERPL DL<=0.005 MIU/L-ACNC: 1.83 UIU/ML

## 2017-08-21 PROCEDURE — 36415 COLL VENOUS BLD VENIPUNCTURE: CPT | Mod: PO

## 2017-08-21 PROCEDURE — 84443 ASSAY THYROID STIM HORMONE: CPT

## 2017-08-21 PROCEDURE — 80061 LIPID PANEL: CPT

## 2017-08-21 PROCEDURE — 80053 COMPREHEN METABOLIC PANEL: CPT

## 2017-08-21 PROCEDURE — 83036 HEMOGLOBIN GLYCOSYLATED A1C: CPT

## 2017-08-23 ENCOUNTER — TELEPHONE (OUTPATIENT)
Dept: FAMILY MEDICINE | Facility: CLINIC | Age: 77
End: 2017-08-23

## 2017-08-23 DIAGNOSIS — N18.30 CKD (CHRONIC KIDNEY DISEASE) STAGE 3, GFR 30-59 ML/MIN: Primary | ICD-10-CM

## 2017-08-23 NOTE — TELEPHONE ENCOUNTER
Pls notify pt:    Overall, labs look good except for slightly low kidney function    Let's gina Dr Leon's USGs (renal and carotid) soon and let's do urine collection when you are there for usgs- pls gina all of this prior to next OV

## 2017-08-24 ENCOUNTER — HOSPITAL ENCOUNTER (OUTPATIENT)
Dept: RADIOLOGY | Facility: HOSPITAL | Age: 77
Discharge: HOME | End: 2017-08-24
Attending: THORACIC SURGERY (CARDIOTHORACIC VASCULAR SURGERY)
Payer: MEDICARE

## 2017-08-24 DIAGNOSIS — I70.1 RENAL ARTERY STENOSIS: ICD-10-CM

## 2017-08-24 DIAGNOSIS — I65.23 BILATERAL CAROTID ARTERY STENOSIS: ICD-10-CM

## 2017-08-24 PROCEDURE — 93880 EXTRACRANIAL BILAT STUDY: CPT | Mod: 26,,, | Performed by: RADIOLOGY

## 2017-08-24 PROCEDURE — 93880 EXTRACRANIAL BILAT STUDY: CPT | Mod: TC,PO

## 2017-08-24 PROCEDURE — 76770 US EXAM ABDO BACK WALL COMP: CPT | Mod: TC,PO

## 2017-08-24 PROCEDURE — 93975 VASCULAR STUDY: CPT | Mod: 26,,, | Performed by: RADIOLOGY

## 2017-08-24 PROCEDURE — 76770 US EXAM ABDO BACK WALL COMP: CPT | Mod: 26,59,, | Performed by: RADIOLOGY

## 2017-08-26 RX ORDER — LEVOTHYROXINE SODIUM 112 UG/1
TABLET ORAL
Qty: 90 TABLET | Refills: 1 | Status: SHIPPED | OUTPATIENT
Start: 2017-08-26 | End: 2017-09-14 | Stop reason: SDUPTHER

## 2017-08-27 ENCOUNTER — TELEPHONE (OUTPATIENT)
Dept: FAMILY MEDICINE | Facility: CLINIC | Age: 77
End: 2017-08-27

## 2017-08-27 DIAGNOSIS — E11.8 TYPE 2 DIABETES MELLITUS WITH COMPLICATION, WITHOUT LONG-TERM CURRENT USE OF INSULIN: Primary | ICD-10-CM

## 2017-08-28 NOTE — TELEPHONE ENCOUNTER
----- Message from Isac Duran sent at 8/28/2017  1:39 PM CDT -----  Contact: Kim Vicente call to jovanny,Kim patient's daughter in law returning call.please call back at [phone]. Thanks,

## 2017-08-28 NOTE — TELEPHONE ENCOUNTER
Notified pt daughter- man. She will discuss further at f/u with Dr. Leon and will f/u with Dr. Rivas as scheduled in September.

## 2017-08-30 ENCOUNTER — PATIENT OUTREACH (OUTPATIENT)
Dept: ADMINISTRATIVE | Facility: HOSPITAL | Age: 77
End: 2017-08-30

## 2017-08-30 NOTE — LETTER
August 30, 2017    Linda Her  [address] [facility] [address]             [facility]  [address]  [facility] [address]  Phone: [phone] Dear Mrs. Her:    [facility] is committed to your overall health.  To help you get the most out of each of your visits, we will review your information to make sure you are up to date on all of your recommended tests and/or procedures.      Dr. Smitha Rivas has found that you may be due for an osteoporosis screening.     If you have had any of the above done at another facility, please bring the records or information with you so that your record at [facility] will be complete.  If you would like to schedule any of these, please contact me.    If you are currently taking medication, please bring it with you to your appointment for review.    Also, if you have any type of Advanced Directives, please bring them with you to your office visit so we may scan them into your chart.    If you have any questions or concerns, please don't hesitate to call.    Thank you for letting us care for you,  Cassi Styles LPN Clinical Care Coordinator  [facility] [place]  [phone]

## 2017-08-31 ENCOUNTER — OFFICE VISIT (OUTPATIENT)
Dept: VASCULAR SURGERY | Facility: CLINIC | Age: 77
End: 2017-08-31
Payer: MEDICARE

## 2017-08-31 VITALS
HEART RATE: 70 BPM | DIASTOLIC BLOOD PRESSURE: 75 MMHG | BODY MASS INDEX: 31.82 KG/M2 | SYSTOLIC BLOOD PRESSURE: 152 MMHG | WEIGHT: 168.44 LBS

## 2017-08-31 DIAGNOSIS — Z71.2 ENCOUNTER TO DISCUSS TEST RESULTS: Primary | ICD-10-CM

## 2017-08-31 DIAGNOSIS — I70.1 ATHEROSCLEROSIS OF RENAL ARTERY: Primary | ICD-10-CM

## 2017-08-31 DIAGNOSIS — Z79.01 CURRENT USE OF LONG TERM ANTICOAGULATION: ICD-10-CM

## 2017-08-31 DIAGNOSIS — I70.1 RENAL ARTERY STENOSIS: ICD-10-CM

## 2017-08-31 PROCEDURE — 1126F AMNT PAIN NOTED NONE PRSNT: CPT | Mod: ,,, | Performed by: THORACIC SURGERY (CARDIOTHORACIC VASCULAR SURGERY)

## 2017-08-31 PROCEDURE — 3078F DIAST BP <80 MM HG: CPT | Mod: ,,, | Performed by: THORACIC SURGERY (CARDIOTHORACIC VASCULAR SURGERY)

## 2017-08-31 PROCEDURE — 1159F MED LIST DOCD IN RCRD: CPT | Mod: ,,, | Performed by: THORACIC SURGERY (CARDIOTHORACIC VASCULAR SURGERY)

## 2017-08-31 PROCEDURE — 1157F ADVNC CARE PLAN IN RCRD: CPT | Mod: ,,, | Performed by: THORACIC SURGERY (CARDIOTHORACIC VASCULAR SURGERY)

## 2017-08-31 PROCEDURE — 99212 OFFICE O/P EST SF 10 MIN: CPT | Mod: PBBFAC,PO | Performed by: THORACIC SURGERY (CARDIOTHORACIC VASCULAR SURGERY)

## 2017-08-31 PROCEDURE — 99999 PR PBB SHADOW E&M-EST. PATIENT-LVL II: CPT | Mod: PBBFAC,,, | Performed by: THORACIC SURGERY (CARDIOTHORACIC VASCULAR SURGERY)

## 2017-08-31 PROCEDURE — 3077F SYST BP >= 140 MM HG: CPT | Mod: ,,, | Performed by: THORACIC SURGERY (CARDIOTHORACIC VASCULAR SURGERY)

## 2017-08-31 PROCEDURE — 99213 OFFICE O/P EST LOW 20 MIN: CPT | Mod: S$PBB,,, | Performed by: THORACIC SURGERY (CARDIOTHORACIC VASCULAR SURGERY)

## 2017-08-31 RX ORDER — SODIUM CHLORIDE 9 MG/ML
INJECTION, SOLUTION INTRAVENOUS CONTINUOUS
Status: CANCELLED | OUTPATIENT
Start: 2017-08-31

## 2017-08-31 NOTE — PROGRESS NOTES
OFFICE NOTE    This is a 77-year-old lady with high-grade left renal artery stenosis and   chronic hypertension.  She has had this issue for some time, but the velocity   seemed to have increased in the left renal artery.  Associated with this, she   has chronic atrial fibrillation; she has, according to the records, pulmonary   hypertension, diabetes, neuropathy and carotid occlusive disease.  Medicines are   noted.  They are part of the recent EPIC record.  She is on Plavix.  She has no   other pertinent family or social history.  She has no recent surgical history.    PHYSICAL EXAMINATION:  VITAL SIGNS:  Stable.  HEENT:  Pupils equal, round and reactive to light.  Nose and throat are clear.  NECK:  Supple.  CHEST:  Clear to auscultation.  HEART:  Irregular rate and rhythm.  ABDOMEN:  Benign.  EXTREMITIES:  Femoral pulses are equal.  Perfusion to the legs and feet is   satisfactory.    The patient has progressive renal artery stenosis associated with significant   hypertension.  We will arrange for renal arterial angiography and if indicated,   angioplasty and stenting.      SMITHA  dd: 08/31/2017 09:48:30 (CDT)  td: 08/31/2017 11:13:35 (CDT)  Doc ID   #[id number]  Job ID #[id number]    CC:

## 2017-09-06 PROBLEM — I70.1 RENAL ARTERY STENOSIS: Status: ACTIVE | Noted: 2017-09-06

## 2017-09-14 ENCOUNTER — OFFICE VISIT (OUTPATIENT)
Dept: FAMILY MEDICINE | Facility: CLINIC | Age: 77
End: 2017-09-14
Payer: MEDICARE

## 2017-09-14 VITALS
OXYGEN SATURATION: 96 % | HEIGHT: 61 IN | RESPIRATION RATE: 17 BRPM | DIASTOLIC BLOOD PRESSURE: 58 MMHG | SYSTOLIC BLOOD PRESSURE: 110 MMHG | BODY MASS INDEX: 31.47 KG/M2 | HEART RATE: 64 BPM | WEIGHT: 166.69 LBS | TEMPERATURE: 98 F

## 2017-09-14 DIAGNOSIS — E11.8 TYPE 2 DIABETES MELLITUS WITH COMPLICATION, WITHOUT LONG-TERM CURRENT USE OF INSULIN: Primary | ICD-10-CM

## 2017-09-14 DIAGNOSIS — M94.9 DISORDER OF CARTILAGE: ICD-10-CM

## 2017-09-14 DIAGNOSIS — N18.30 CKD (CHRONIC KIDNEY DISEASE) STAGE 3, GFR 30-59 ML/MIN: ICD-10-CM

## 2017-09-14 DIAGNOSIS — Z13.820 SCREENING FOR OSTEOPOROSIS: ICD-10-CM

## 2017-09-14 DIAGNOSIS — R80.9 PROTEINURIA, UNSPECIFIED TYPE: ICD-10-CM

## 2017-09-14 DIAGNOSIS — R26.81 GAIT INSTABILITY: ICD-10-CM

## 2017-09-14 PROCEDURE — 3044F HG A1C LEVEL LT 7.0%: CPT | Mod: S$GLB,,, | Performed by: FAMILY MEDICINE

## 2017-09-14 PROCEDURE — 1157F ADVNC CARE PLAN IN RCRD: CPT | Mod: S$GLB,,, | Performed by: FAMILY MEDICINE

## 2017-09-14 PROCEDURE — 1126F AMNT PAIN NOTED NONE PRSNT: CPT | Mod: S$GLB,,, | Performed by: FAMILY MEDICINE

## 2017-09-14 PROCEDURE — 4010F ACE/ARB THERAPY RXD/TAKEN: CPT | Mod: S$GLB,,, | Performed by: FAMILY MEDICINE

## 2017-09-14 PROCEDURE — 3078F DIAST BP <80 MM HG: CPT | Mod: S$GLB,,, | Performed by: FAMILY MEDICINE

## 2017-09-14 PROCEDURE — 99214 OFFICE O/P EST MOD 30 MIN: CPT | Mod: S$GLB,,, | Performed by: FAMILY MEDICINE

## 2017-09-14 PROCEDURE — 3074F SYST BP LT 130 MM HG: CPT | Mod: S$GLB,,, | Performed by: FAMILY MEDICINE

## 2017-09-14 PROCEDURE — 1159F MED LIST DOCD IN RCRD: CPT | Mod: S$GLB,,, | Performed by: FAMILY MEDICINE

## 2017-09-14 RX ORDER — METOPROLOL TARTRATE 25 MG/1
TABLET, FILM COATED ORAL
Qty: 180 TABLET | Refills: 1 | Status: SHIPPED | OUTPATIENT
Start: 2017-09-14

## 2017-09-14 RX ORDER — CLOPIDOGREL BISULFATE 75 MG/1
TABLET ORAL
Qty: 90 TABLET | Refills: 3 | Status: SHIPPED | OUTPATIENT
Start: 2017-09-14

## 2017-09-14 RX ORDER — ATORVASTATIN CALCIUM 40 MG/1
TABLET, FILM COATED ORAL
Qty: 90 TABLET | Refills: 1 | Status: SHIPPED | OUTPATIENT
Start: 2017-09-14 | End: 2017-11-26 | Stop reason: SDUPTHER

## 2017-09-14 RX ORDER — LANCETS
EACH MISCELLANEOUS
Qty: 100 EACH | Refills: 3 | Status: SHIPPED | OUTPATIENT
Start: 2017-09-14

## 2017-09-14 RX ORDER — INSULIN PUMP SYRINGE, 3 ML
EACH MISCELLANEOUS
Qty: 1 EACH | Refills: 0 | Status: SHIPPED | OUTPATIENT
Start: 2017-09-14

## 2017-09-14 RX ORDER — LEVOTHYROXINE SODIUM 112 UG/1
TABLET ORAL
Qty: 90 TABLET | Refills: 3 | Status: SHIPPED | OUTPATIENT
Start: 2017-09-14

## 2017-09-14 RX ORDER — LISINOPRIL 20 MG/1
TABLET ORAL
Qty: 90 TABLET | Refills: 1 | Status: SHIPPED | OUTPATIENT
Start: 2017-09-14

## 2017-09-15 ENCOUNTER — HOSPITAL ENCOUNTER (OUTPATIENT)
Dept: RADIOLOGY | Facility: HOSPITAL | Age: 77
Discharge: HOME | End: 2017-09-15
Attending: FAMILY MEDICINE
Payer: MEDICARE

## 2017-09-15 DIAGNOSIS — Z13.820 SCREENING FOR OSTEOPOROSIS: ICD-10-CM

## 2017-09-15 DIAGNOSIS — M94.9 DISORDER OF CARTILAGE: ICD-10-CM

## 2017-09-15 PROCEDURE — 77080 DXA BONE DENSITY AXIAL: CPT | Mod: TC,PO

## 2017-09-15 PROCEDURE — 77080 DXA BONE DENSITY AXIAL: CPT | Mod: 26,,, | Performed by: RADIOLOGY

## 2017-09-17 NOTE — PROGRESS NOTES
Subjective:       Patient ID: Linda Her is a 77 y.o. female.    Chief Complaint: Follow-up (6 month fu)    HPI   The patient is a 77-year-old who is here today for follow-up.  Overall, she is doing fairly well.  Today we discussed the followin)  diabetes.  Her recent A1c was 6.8.  She does not check her sugars at home.  She has not felt as if her sugars have been high or low  2)  hypertension.  On , she had angioplasty with stent for renal artery stenosis.  Currently, her blood pressure medications include Norvasc 5 mg once a day, Lopressor 25 mg twice a and lisinopril 20 mg once a day.  She has not felt as her blood pressure has been high or low  3)  hyperlipidemia.  She is doing well the Lipitor.  Her recent LDL was 94  4)  hypothyroidism.  She is doing well with her Synthroid.  Her recent TSH was 1.8  5)  chronic kidney disease with proteinuria.  She has not seen the nephrologist since May 2015.  She would be willing to reschedule with the nephrologist  6)  MGUS.  She is seeing the hematologist yearly and is due back in the spring   7)  PAF.  She is on amiodarone.  At her last visit, the cardiologist did decrease the dose of this medication.  She does not have any symptoms associated with her A. fib.    During her exam, I noticed that she was having some gait instability and seemed weak in her legs walking to the table and stepping onto the table.  She has had a recent fall.  We did discuss therapy which he would be interested in doing    Review of Systems   Constitutional: Negative for appetite change, chills, diaphoresis, fatigue, fever and unexpected weight change.   HENT: Negative for congestion, ear pain, postnasal drip, rhinorrhea, sinus pressure, sneezing, sore throat and trouble swallowing.    Eyes: Negative for pain, discharge and visual disturbance.   Respiratory: Negative for cough, chest tightness, shortness of breath and wheezing.    Cardiovascular: Negative for chest pain,  palpitations and leg swelling.   Gastrointestinal: Negative for abdominal distention, abdominal pain, blood in stool, constipation, diarrhea, nausea and vomiting.   Skin: Negative for rash.       Objective:      Physical Exam   Constitutional: She is oriented to person, place, and time. She appears well-developed and well-nourished. No distress.   HENT:   Head: Normocephalic and atraumatic.   Right Ear: Hearing, tympanic membrane, external ear and ear canal normal.   Left Ear: Hearing, tympanic membrane, external ear and ear canal normal.   Nose: Nose normal.   Mouth/Throat: Oropharynx is clear and moist and mucous membranes are normal. No oral lesions. No oropharyngeal exudate, posterior oropharyngeal edema or posterior oropharyngeal erythema.   Eyes: Conjunctivae and EOM are normal. Pupils are equal, round, and reactive to light. No scleral icterus.   Positive ptosis involving the left eye.  Positive strabismus involving the left eye   Neck: Normal range of motion. Neck supple. Carotid bruit is present (bilaterally). No thyroid mass and no thyromegaly present.   The right side of the neck reveals a well healed right-sided CEA incision   Cardiovascular: Normal rate and regular rhythm.   No extrasystoles are present. PMI is not displaced.  Exam reveals no gallop.    Murmur heard.   Systolic murmur is present with a grade of 4/6   Pulmonary/Chest: Effort normal and breath sounds normal. No accessory muscle usage. No respiratory distress.   Clear to auscultation bilaterally.   Abdominal: Soft. Normal appearance and bowel sounds are normal. She exhibits no abdominal bruit. There is no hepatosplenomegaly. There is no tenderness. There is no rebound.   Musculoskeletal:   She has a slow wide-based gait   Lymphadenopathy:        Head (right side): No submental and no submandibular adenopathy present.        Head (left side): No submental and no submandibular adenopathy present.        Right cervical: No superficial  "cervical, no deep cervical and no posterior cervical adenopathy present.       Left cervical: No superficial cervical, no deep cervical and no posterior cervical adenopathy present.        Right: No supraclavicular adenopathy present.        Left: No supraclavicular adenopathy present.   Neurological: She is alert and oriented to person, place, and time.   Skin: Skin is warm, dry and intact.   Psychiatric: She has a normal mood and affect.     Blood pressure (!) 110/58, pulse 64, temperature 97.8 °F (36.6 °C), temperature source Oral, resp. rate 17, height 5' 1" (1.549 m), weight 75.6 kg (166 lb 10.7 oz), SpO2 96 %.Body mass index is 31.49 kg/m².          A/P:  1)  diabetes.  Well controlled.  Continue with dietary interventions.  We will recheck an A1c in 6 months  2)  hypertension with recent renal artery stenosis angioplasty and stenting.  Well-controlled and possibly overcontrolled following the renal artery stenosis treatment.  We will stop her Norvasc.  She will continue with the lisinopril and Lopressor.  If her blood pressure with her vascular surgeon is higher than 139/89, we will make adjustments in her medication  3)  hyperlipidemia.  Well controlled.  Continue with Lipitor.  We will check fasting labs in 6 months   4)  hypothyroidism.  Well controlled.  Continue with Synthroid.  We will recheck a TSH in 6 months given her amiodarone  5)  chronic kidney disease with proteinuria.  Stable.  We will reestablish her with the nephrologist  7)  PAF.  Asymptomatic.  Follow up with cardiology as planned and continue with amiodarone under their direction  8)  gait instability with recent fall.  We will schedule her for physical therapy  9)  health maintenance issues.  We will schedule her DEXA scan    As long as she does well, I will see her back in 6 months with fasting labs prior to that visit  "

## 2017-09-18 ENCOUNTER — TELEPHONE (OUTPATIENT)
Dept: FAMILY MEDICINE | Facility: CLINIC | Age: 77
End: 2017-09-18

## 2017-09-18 ENCOUNTER — OFFICE VISIT (OUTPATIENT)
Dept: NEPHROLOGY | Facility: CLINIC | Age: 77
End: 2017-09-18
Payer: MEDICARE

## 2017-09-18 VITALS
SYSTOLIC BLOOD PRESSURE: 120 MMHG | WEIGHT: 166.44 LBS | HEART RATE: 72 BPM | BODY MASS INDEX: 31.45 KG/M2 | OXYGEN SATURATION: 97 % | DIASTOLIC BLOOD PRESSURE: 70 MMHG

## 2017-09-18 DIAGNOSIS — I10 ESSENTIAL HYPERTENSION: ICD-10-CM

## 2017-09-18 DIAGNOSIS — E11.8 TYPE 2 DIABETES MELLITUS WITH COMPLICATION, WITHOUT LONG-TERM CURRENT USE OF INSULIN: Primary | ICD-10-CM

## 2017-09-18 DIAGNOSIS — N18.2 CHRONIC KIDNEY DISEASE, STAGE II (MILD): Primary | ICD-10-CM

## 2017-09-18 DIAGNOSIS — I70.1 RENAL ARTERY STENOSIS: ICD-10-CM

## 2017-09-18 PROCEDURE — 1126F AMNT PAIN NOTED NONE PRSNT: CPT | Mod: ,,, | Performed by: INTERNAL MEDICINE

## 2017-09-18 PROCEDURE — 99212 OFFICE O/P EST SF 10 MIN: CPT | Mod: PBBFAC,PO | Performed by: INTERNAL MEDICINE

## 2017-09-18 PROCEDURE — 1157F ADVNC CARE PLAN IN RCRD: CPT | Mod: ,,, | Performed by: INTERNAL MEDICINE

## 2017-09-18 PROCEDURE — 1159F MED LIST DOCD IN RCRD: CPT | Mod: ,,, | Performed by: INTERNAL MEDICINE

## 2017-09-18 PROCEDURE — 3078F DIAST BP <80 MM HG: CPT | Mod: ,,, | Performed by: INTERNAL MEDICINE

## 2017-09-18 PROCEDURE — 99214 OFFICE O/P EST MOD 30 MIN: CPT | Mod: S$PBB,,, | Performed by: INTERNAL MEDICINE

## 2017-09-18 PROCEDURE — 99999 PR PBB SHADOW E&M-EST. PATIENT-LVL II: CPT | Mod: PBBFAC,,, | Performed by: INTERNAL MEDICINE

## 2017-09-18 PROCEDURE — 3074F SYST BP LT 130 MM HG: CPT | Mod: ,,, | Performed by: INTERNAL MEDICINE

## 2017-09-18 NOTE — PROGRESS NOTES
Subjective:       Patient ID: Linda Her is a 77 y.o. White female who presents for new patient evaluation for chronic renal failure.    Linda Her is referred by Smitha Rivas MD to be evaluated for chronic renal failure.  She recently had a procedure by Dr. Leon on her L PRITESH.  She has no uremic or urinary symptoms and is in her usual state of health.  She denies any new medications.        Review of Systems   Constitutional: Negative for appetite change, chills and fever.   Eyes: Negative for visual disturbance.   Respiratory: Negative for cough and shortness of breath.    Cardiovascular: Negative for chest pain and leg swelling.   Gastrointestinal: Negative for diarrhea, nausea and vomiting.   Genitourinary: Negative for difficulty urinating, dysuria and hematuria.   Musculoskeletal: Positive for back pain. Negative for myalgias.   Skin: Negative for rash.   Neurological: Negative for headaches.   Psychiatric/Behavioral: Negative for sleep disturbance.       The past medical, family and social histories were reviewed for this encounter.     Past Medical History:   Diagnosis Date    Amblyopia     OS    Anticoagulant long-term use     B12 nutritional deficiency     Carotid stenosis     s/p R CEA 10/15; CTA 4/15 with 50-80% occlusion    Cataract     OU    Cervical stenosis of spinal canal     noted on CTA 3/15    Diastolic dysfunction     noted on 6/11 ECHO    DM (diabetes mellitus)     Episodic atrial fibrillation     x 2 during admit with urosepsis    Exotropia of both eyes     History of renal artery stenosis 09/2017    s/p angioplasty and stent    History of sepsis     urosepsis from pyelo and hydronephrosis    Hypercholesteremia     Hypertension     Hypothyroidism     Iliac artery stenosis, bilateral     noted on 6/12 USG    Mild pulmonary hypertension     8/12 ECHO    Monoclonal gammopathy     Osteopenia     dex 9/17    PAF (paroxysmal atrial fibrillation)     Ptosis of  eyelid, left     S/P colonoscopy     5/14    Strabismus     OS    Subclavian artery stenosis, right     noted on 6/11 USG    Valvular heart disease     mod to severe AS noted 3/15 ECHO     Past Surgical History:   Procedure Laterality Date    CAROTID ENDARTERECTOMY      Right 15    COLON SURGERY  7/27/15    Colon Resection with colostomy    COLOSTOMY CLOSURE  1/22/16    HYSTERECTOMY  01/22/2016    with vaginal vault suspension     Social History     Social History    Marital status:      Spouse name: N/A    Number of children: N/A    Years of education: N/A     Occupational History    Not on file.     Social History Main Topics    Smoking status: Former Smoker     Packs/day: 2.00     Years: 50.00     Quit date: 5/18/2002    Smokeless tobacco: Never Used    Alcohol use No    Drug use: No    Sexual activity: No     Other Topics Concern    Not on file     Social History Narrative    No narrative on file     Current Outpatient Prescriptions   Medication Sig    amiodarone (PACERONE) 100 MG Tab Take 1 tablet (100 mg total) by mouth once daily.    atorvastatin (LIPITOR) 40 MG tablet TAKE 1 TABLET(40 MG) BY MOUTH EVERY EVENING    blood sugar diagnostic Strp To check BG once a daily, to use with insurance preferred meter    blood-glucose meter kit As directed    cholecalciferol, vitamin D3, (VITAMIN D3) 2,000 unit Cap Take 1 capsule by mouth once daily.    clopidogrel (PLAVIX) 75 mg tablet TAKE 1 TABLET(75 MG) BY MOUTH EVERY MORNING    docusate sodium (COLACE) 100 MG capsule Take 100 mg by mouth once daily.    ferrous sulfate 325 mg (65 mg iron) Tab tablet Take 1 tablet (325 mg total) by mouth once daily.    lancets Misc To check BG once a daily, to use with insurance preferred meter    levothyroxine (SYNTHROID) 112 MCG tablet TAKE 1 TABLET(112 MCG) BY MOUTH EVERY DAY    lisinopril (PRINIVIL,ZESTRIL) 20 MG tablet TAKE 1 TABLET(20 MG) BY MOUTH EVERY MORNING    metoprolol tartrate  (LOPRESSOR) 25 MG tablet TAKE 1 TABLET(25 MG) BY MOUTH TWICE DAILY     No current facility-administered medications for this visit.        /70   Pulse 72   Wt 75.5 kg (166 lb 7.2 oz)   SpO2 97%   BMI 31.45 kg/m²     Objective:      Physical Exam   Constitutional: She is oriented to person, place, and time. She appears well-developed and well-nourished. No distress.   HENT:   Head: Normocephalic and atraumatic.   Eyes: Conjunctivae are normal.   Neck: Neck supple. No JVD present.   Cardiovascular: Normal rate and regular rhythm.  Exam reveals no gallop and no friction rub.    Murmur (2/6) heard.  Pulmonary/Chest: Effort normal and breath sounds normal. No respiratory distress. She has no wheezes. She has no rales.   Abdominal: Soft. Bowel sounds are normal. She exhibits no distension. There is no tenderness.   Musculoskeletal: She exhibits no edema.   Neurological: She is alert and oriented to person, place, and time.   Skin: Skin is warm and dry. No rash noted.   Psychiatric: She has a normal mood and affect.   Vitals reviewed.      Assessment:       1. Chronic kidney disease, stage II (mild)    2. Renal artery stenosis    3. Essential hypertension        Plan:   Return to clinic in 6 months.  Labs for next visit include rp.  Baseline creatinine is 0.9-1.1.  Renal US shows R 10.5, L 10.0.  She does have some evidence of bilateral hydronephrosis.  She has age-appropriate renal function.

## 2017-09-18 NOTE — TELEPHONE ENCOUNTER
Pls notify pt:    Dexa shows osteopenia but not osteoporosis.  This is new since the last dexa showing bones are getting weaker.  Are you taking any calcium supplementation?  If so, how much?    Labs were done early than planned and looked similar to labs from last month.  Let's plan to recheck labs in 6 months with fu visit with me after that

## 2017-09-18 NOTE — LETTER
September 18, 2017      Smitha Rivas MD  [address] [address] [address]           [facility] [address] [facility] [address] [place] [address]  Phone: [phone]          Patient: Linda Her   MR Number: [id number]   YOB: 1940   Date of Visit: 9/18/2017       Dear Dr. Smitha Rivas:    Thank you for referring Linda Her to me for evaluation. Attached you will find relevant portions of my assessment and plan of care.    If you have questions, please do not hesitate to call me. I look forward to following Linda Her along with you.    Sincerely,    Fede Conley MD    Enclosure  CC:  No Recipients    If you would like to receive this communication electronically, please contact [email or web address] or [phone] to request more information on [employer] Link access.    For providers and/or their staff who would like to refer a patient to [facility], please contact us through our one-stop-shop provider referral line, [facility] , at [phone].    If you feel you have received this communication in error or would no longer like to receive these types of communications, please e-mail [email or web address]

## 2017-09-27 ENCOUNTER — TELEPHONE (OUTPATIENT)
Dept: FAMILY MEDICINE | Facility: CLINIC | Age: 77
End: 2017-09-27

## 2017-09-27 NOTE — TELEPHONE ENCOUNTER
Spoke w/ pt daughter Kim , form for diabetic supplies was faxed to [facility] yesterday.--lp

## 2017-09-27 NOTE — TELEPHONE ENCOUNTER
----- Message from Luisana Lentzan sent at 9/27/2017  9:54 AM CDT -----  Patients daughter in law states that she need to speak to you about patients meter.  Please call  Kim at [phone].

## 2017-09-28 ENCOUNTER — OFFICE VISIT (OUTPATIENT)
Dept: VASCULAR SURGERY | Facility: CLINIC | Age: 77
End: 2017-09-28
Payer: MEDICARE

## 2017-09-28 VITALS
BODY MASS INDEX: 31.47 KG/M2 | HEIGHT: 61 IN | WEIGHT: 166.69 LBS | SYSTOLIC BLOOD PRESSURE: 162 MMHG | DIASTOLIC BLOOD PRESSURE: 77 MMHG | HEART RATE: 71 BPM

## 2017-09-28 PROCEDURE — 99211 OFF/OP EST MAY X REQ PHY/QHP: CPT | Mod: S$PBB,,, | Performed by: THORACIC SURGERY (CARDIOTHORACIC VASCULAR SURGERY)

## 2017-09-28 PROCEDURE — 99213 OFFICE O/P EST LOW 20 MIN: CPT | Mod: PBBFAC,PO | Performed by: THORACIC SURGERY (CARDIOTHORACIC VASCULAR SURGERY)

## 2017-09-28 PROCEDURE — 99999 PR PBB SHADOW E&M-EST. PATIENT-LVL III: CPT | Mod: PBBFAC,,, | Performed by: THORACIC SURGERY (CARDIOTHORACIC VASCULAR SURGERY)

## 2017-09-28 NOTE — PROGRESS NOTES
OFFICE NOTE    This is a 77-year-old lady who had recent angiography and angioplasty and   stenting of a high-grade left renal artery stenosis for renovascular   hypertension.  The patient has done well.  She has no major complaints today.    Problem list reviewed.   Her medicines are noted.  She does take Plavix.  Blood   pressure today is a little bit high at 162/77; otherwise, she has no major   complaints.  She has no problems with the puncture site in the groin.  At this   point, she can resume her usual activities.  She has done quite well.  I do not   anticipate any major problems at this time.  We should get a repeat renal artery   ultrasound in three or four months, and then based on this, make further   recommendations, but she seems to be doing quite well.      ELFEGO  dd: 09/28/2017 10:09:22 (CDT)  td: 09/28/2017 23:50:38 (CDT)  Doc ID   #[id number]  Job ID #[id number]    CC:

## 2017-11-15 ENCOUNTER — OFFICE VISIT (OUTPATIENT)
Dept: FAMILY MEDICINE | Facility: CLINIC | Age: 77
End: 2017-11-15
Payer: MEDICARE

## 2017-11-15 ENCOUNTER — HOSPITAL ENCOUNTER (OUTPATIENT)
Dept: RADIOLOGY | Facility: HOSPITAL | Age: 77
Discharge: HOME | End: 2017-11-15
Attending: NURSE PRACTITIONER
Payer: MEDICARE

## 2017-11-15 ENCOUNTER — TELEPHONE (OUTPATIENT)
Dept: FAMILY MEDICINE | Facility: CLINIC | Age: 77
End: 2017-11-15

## 2017-11-15 VITALS
HEIGHT: 61 IN | SYSTOLIC BLOOD PRESSURE: 136 MMHG | TEMPERATURE: 98 F | WEIGHT: 169.19 LBS | RESPIRATION RATE: 16 BRPM | DIASTOLIC BLOOD PRESSURE: 72 MMHG | BODY MASS INDEX: 31.94 KG/M2 | HEART RATE: 64 BPM | OXYGEN SATURATION: 97 %

## 2017-11-15 DIAGNOSIS — R26.9 GAIT ABNORMALITY: ICD-10-CM

## 2017-11-15 DIAGNOSIS — G89.29 CHRONIC PAIN OF LEFT KNEE: ICD-10-CM

## 2017-11-15 DIAGNOSIS — M25.562 CHRONIC PAIN OF LEFT KNEE: ICD-10-CM
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
## 2017-11-15 DIAGNOSIS — R42 DIZZINESS: ICD-10-CM

## 2017-11-15 LAB
ALBUMIN SERPL BCP-MCNC: 3.6 G/DL
ALP SERPL-CCNC: 112 U/L
ALT SERPL W/O P-5'-P-CCNC: 9 U/L
ANION GAP SERPL CALC-SCNC: 11 MMOL/L
AST SERPL-CCNC: 16 U/L
BASOPHILS # BLD AUTO: 0.09 K/UL
BASOPHILS NFR BLD: 1.2 %
BILIRUB SERPL-MCNC: 0.6 MG/DL
BUN SERPL-MCNC: 23 MG/DL
CALCIUM SERPL-MCNC: 10.1 MG/DL
CHLORIDE SERPL-SCNC: 104 MMOL/L
CO2 SERPL-SCNC: 26 MMOL/L
CREAT SERPL-MCNC: 1.1 MG/DL
DIFFERENTIAL METHOD: ABNORMAL
EOSINOPHIL # BLD AUTO: 0.2 K/UL
EOSINOPHIL NFR BLD: 2.8 %
ERYTHROCYTE [DISTWIDTH] IN BLOOD BY AUTOMATED COUNT: 14.5 %
EST. GFR  (AFRICAN AMERICAN): 56.8 ML/MIN/1.73 M^2
EST. GFR  (NON AFRICAN AMERICAN): 49.2 ML/MIN/1.73 M^2
GLUCOSE SERPL-MCNC: 130 MG/DL
HCT VFR BLD AUTO: 37.9 %
HGB BLD-MCNC: 12.2 G/DL
IMM GRANULOCYTES # BLD AUTO: 0.05 K/UL
IMM GRANULOCYTES NFR BLD AUTO: 0.7 %
LYMPHOCYTES # BLD AUTO: 1.6 K/UL
LYMPHOCYTES NFR BLD: 20.6 %
MCH RBC QN AUTO: 29 PG
MCHC RBC AUTO-ENTMCNC: 32.2 G/DL
MCV RBC AUTO: 90 FL
MONOCYTES # BLD AUTO: 0.6 K/UL
MONOCYTES NFR BLD: 7.9 %
NEUTROPHILS # BLD AUTO: 5 K/UL
NEUTROPHILS NFR BLD: 66.8 %
NRBC BLD-RTO: 0 /100 WBC
PLATELET # BLD AUTO: 291 K/UL
PMV BLD AUTO: 10.9 FL
POTASSIUM SERPL-SCNC: 4.8 MMOL/L
PROT SERPL-MCNC: 8 G/DL
RBC # BLD AUTO: 4.21 M/UL
SODIUM SERPL-SCNC: 141 MMOL/L
WBC # BLD AUTO: 7.51 K/UL

## 2017-11-15 PROCEDURE — 73562 X-RAY EXAM OF KNEE 3: CPT | Mod: 26,LT,, | Performed by: RADIOLOGY

## 2017-11-15 PROCEDURE — 99214 OFFICE O/P EST MOD 30 MIN: CPT | Mod: S$GLB,,, | Performed by: NURSE PRACTITIONER

## 2017-11-15 PROCEDURE — 73560 X-RAY EXAM OF KNEE 1 OR 2: CPT | Mod: 26,59,RT, | Performed by: RADIOLOGY

## 2017-11-15 PROCEDURE — 80053 COMPREHEN METABOLIC PANEL: CPT

## 2017-11-15 PROCEDURE — 36415 COLL VENOUS BLD VENIPUNCTURE: CPT | Mod: S$GLB,,, | Performed by: INTERNAL MEDICINE

## 2017-11-15 PROCEDURE — 73560 X-RAY EXAM OF KNEE 1 OR 2: CPT | Mod: TC,PO,RT

## 2017-11-15 PROCEDURE — 85025 COMPLETE CBC W/AUTO DIFF WBC: CPT

## 2017-11-15 NOTE — PROGRESS NOTES
Subjective:       Patient ID: Linda Her is a 77 y.o. female.    Chief Complaint: Dizziness (today) and Fall (fell on rocks at home today / fell on butt)    HPI She is here with her daughter. States that earlier today she was outside in the [place] when she fell. States she had her walker with her and went to turn around at which time she felt a little dizzy and fell down landing on her bottom. She states that she just turned too quick. She denies any chest pain, SOB, N/V/D. She states that she did eat breakfast this morning and has been drinking plenty of fluids. Her daughter states that she goes to the bathroom often. She denies any burning or pain with urination. No urgency, but does have some incontinence. She denies any symptoms of URI. She denies any fever. She denies any lower extremity weakness. She does states that her left knee hurts her a lot and has pressure and pain behind the knee cap at times. She states she does not feel that her knees or legs went out from under her. She has good BP reading at this time. She does not appear in distress. She is alert and oriented. Her daughter states that she is scheduled to start physical therapy tomorrow. See ROS.    The following portion of the patients history was reviewed and updated as appropriate: allergies, current medications, past medical and surgical history. Past social history and problem list reviewed. Family PMH and Past social history reviewed. Tobacco, Illicit drug use reviewed.     Review of Systems   Constitutional: Negative for activity change, appetite change, fatigue and fever.   HENT: Negative for congestion and rhinorrhea.    Eyes: Negative for visual disturbance.   Respiratory: Negative for cough, shortness of breath and wheezing.    Cardiovascular: Negative for chest pain and palpitations.   Gastrointestinal: Negative for abdominal pain, constipation, diarrhea, nausea and vomiting.   Genitourinary: Positive for frequency.  "Negative for difficulty urinating.   Musculoskeletal: Positive for arthralgias (knees).   Neurological: Positive for dizziness (none now. just lated a second this morning when she turned quickly). Negative for weakness, light-headedness and headaches.       Objective:     /72   Pulse 64   Temp 97.8 °F (36.6 °C) (Oral)   Resp 16   Ht 5' 1" (1.549 m)   Wt 76.7 kg (169 lb 3.2 oz)   SpO2 97%   BMI 31.97 kg/m²      Physical Exam     Constitutional: oriented to person, place, and time. well-developed and well-nourished.   HENT: nares patent. Throat without erythema. Canals clear. TM with normal light reflex.  Head: Normocephalic.   Eyes: Conjunctivae are normal. Pupils are equal, round, and reactive to light.   Neck: Normal range of motion. Neck supple. No tracheal deviation present. No thyromegaly present.   Cardiovascular: Normal rate, regular rhythm  Grade 4/6 systolic murmur    Pulmonary/Chest: Effort normal and breath sounds normal. No respiratory distress. No wheezes.   Abdominal: Soft. Bowel sounds are normal. No distension. There is no tenderness.   Musculoskeletal: Normal range of motion with slow steady gait. Uses walker for support but able to ambulate without assistance to the exam table and climb up to sit.  left knee with crepitus. No tenderness with palpation.   Neurological: oriented to person, place, and time.   Skin: Skin is warm and dry. No rashes or lesions. No bruising to buttocks or lower back.     Assessment:       1. Fall, initial encounter    2. Dizziness    3. Chronic pain of left knee        Plan:         Linda was seen today for dizziness and fall.    Diagnoses and all orders for this visit:    Fall, initial encounter: will check labs. Most likely due to turning too quickly on uneven [place] driveway.   -     CBC auto differential  -     Comprehensive metabolic panel    Dizziness: no further indication of dizziness. Will check labs and monitor. I wanted to check urine but she is " unable to void at this time.   -     CBC auto differential  -     Comprehensive metabolic panel  -     Cancel: POCT urine dipstick without microscope    Chronic pain of left knee: will get xray of the left knee and refer to Orthopedics.   -     X-ray Knee Ortho Left; Future  -     Ambulatory referral to Orthopedics    Gait abnormality: she is scheduled to start physical therapy tomorrow. It is fine to continue with that appointment.    If dizziness occurs again or any other symptoms arise follow up immediately or go to ER.    Continue current medication  Take medications only as prescribed  Healthy diet, exercise  Adequate rest  Adequate hydration  Avoid allergens  Avoid excessive caffeine

## 2017-11-15 NOTE — TELEPHONE ENCOUNTER
----- Message from Melisa Sandoval sent at 11/15/2017  1:07 PM CST -----  Contact: Patient's daughter-in-law, Kim  Patient's daughter-in-law is trying to get patient an appointment with Dr. Rivas and there are none available until 12/1. Patient has been falling and is dizzy and the daughter-in-law is stating that Dr. Rivas is the only one who knows patients history.  Call Back#[phone]  Thanks

## 2017-11-16 ENCOUNTER — TELEPHONE (OUTPATIENT)
Dept: FAMILY MEDICINE | Facility: CLINIC | Age: 77
End: 2017-11-16

## 2017-11-16 ENCOUNTER — OFFICE VISIT (OUTPATIENT)
Dept: ORTHOPEDICS | Facility: CLINIC | Age: 77
End: 2017-11-16
Payer: MEDICARE

## 2017-11-16 ENCOUNTER — CLINICAL SUPPORT (OUTPATIENT)
Dept: REHABILITATION | Facility: HOSPITAL | Age: 77
End: 2017-11-16
Attending: FAMILY MEDICINE
Payer: MEDICARE

## 2017-11-16 VITALS — WEIGHT: 169.06 LBS | BODY MASS INDEX: 31.92 KG/M2 | HEIGHT: 61 IN

## 2017-11-16 DIAGNOSIS — R26.9 GAIT ABNORMALITY: ICD-10-CM

## 2017-11-16 DIAGNOSIS — M17.12 OSTEOARTHROSIS, LOCALIZED, PRIMARY, KNEE, LEFT: Primary | ICD-10-CM

## 2017-11-16 PROCEDURE — 99203 OFFICE O/P NEW LOW 30 MIN: CPT | Mod: S$PBB,,, | Performed by: ORTHOPAEDIC SURGERY

## 2017-11-16 PROCEDURE — G8978 MOBILITY CURRENT STATUS: HCPCS | Mod: CK,PN | Performed by: PHYSICAL THERAPIST

## 2017-11-16 PROCEDURE — 99212 OFFICE O/P EST SF 10 MIN: CPT | Mod: PBBFAC,PO | Performed by: ORTHOPAEDIC SURGERY

## 2017-11-16 PROCEDURE — 99999 PR PBB SHADOW E&M-EST. PATIENT-LVL II: CPT | Mod: PBBFAC,,, | Performed by: ORTHOPAEDIC SURGERY

## 2017-11-16 PROCEDURE — 97161 PT EVAL LOW COMPLEX 20 MIN: CPT | Mod: PN | Performed by: PHYSICAL THERAPIST

## 2017-11-16 PROCEDURE — G8979 MOBILITY GOAL STATUS: HCPCS | Mod: CJ,PN | Performed by: PHYSICAL THERAPIST

## 2017-11-16 NOTE — LETTER
November 16, 2017      Ana Rocha, KELLIE  [address] [facility] [address]  [facility] [address]           [facility] [address] [facility] [address] [place] [address]  Phone: [phone]          Patient: Linda Her   MR Number: [id number]   YOB: 1940   Date of Visit: 11/16/2017       Dear Ana Rocha:    Thank you for referring Linda Her to me for evaluation. Attached you will find relevant portions of my assessment and plan of care.    If you have questions, please do not hesitate to call me. I look forward to following Linda Her along with you.    Sincerely,    London Dominguez MD    Enclosure  CC:  No Recipients    If you would like to receive this communication electronically, please contact [email or web address] or [phone] to request more information on [employer] Link access.    For providers and/or their staff who would like to refer a patient to [facility], please contact us through our one-stop-shop provider referral line, [facility], at [phone].    If you feel you have received this communication in error or would no longer like to receive these types of communications, please e-mail [email or web address]

## 2017-11-16 NOTE — PATIENT INSTRUCTIONS
Strengthening: Quadriceps Set        Tighten muscles on top of thighs by pushing knees down into surface. Hold 3____ seconds.  Repeat _10___ times per set. Do _3___ sets per session. Do 1-2____ sessions per day.     https://orth.[employer].us/602     Copyright © [employer]. All rights reserved.     Side Lying Hip Abduction (Strength)    1. Lie down on the floor on your side. Rest your head on your arm. Bend your legs at the knees.  2. Keep your feet together and lift your top leg up so that your knees are . Keep your hips steady.     3. Slowly lower your leg back down.  4. Repeat 10 times, or as instructed.  5. Switch sides if instructed.     Challenge yourself  Put an elastic band or tubing around your thighs. Raise and lower your top leg slowly and steadily.      Date Last Reviewed: 3/29/2016  © 0468-6640 [employer]. [address] [place] [address]. All rights reserved. This information is not intended as a substitute for professional medical care. Always follow your healthcare professional's instructions.      Hamstring Stretch (Sitting)        Sitting, extend one leg and place hands on same thigh for support. Keeping torso straight, lean forward, sliding hands down leg, until a stretch is felt in back of thigh.  Hold _30___ seconds. Repeat with other leg.      Copyright © [employer]. All rights reserved.   Straight Leg Raise        Squeeze pelvic floor and hold. Tighten top of left thigh. Raise leg off bed 2___ inches. Hold for 2___ seconds. Relax for _3__ seconds. Repeat _10__ times. Do _1-2__ times a day. Repeat with other leg.
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
## 2017-11-16 NOTE — TELEPHONE ENCOUNTER
----- Message from Carlene Medina sent at 11/16/2017 12:02 PM CST -----  Contact: Kim Her (Relative)  Kim Her (Relative) returning a missed call about test results. Please advise. Call to pod. No answer  Call back   Thanks!

## 2017-11-17 NOTE — PROGRESS NOTES
DATE: 11/16/2017  PATIENT: Linda Her  REFERRING MD:  CHIEF COMPLAINT:   Chief Complaint   Patient presents with    Left Knee - Pain    Right Knee - Pain       HISTORY:  Linda Her is a 77 y.o. female  who presents for initial evaluation of left knee pain.  She notes a few month history of increasing discomfort.  She states she saw primary care physician ordered physical therapy.  States that is made her symptoms worse.  Pain is reported at 8/10.  She notes pain with weightbearing.  She denies any significant swelling.  She denies any previous injuries to her knee.       PAST MEDICAL/SURGICAL HISTORY:  Past Medical History:   Diagnosis Date    Amblyopia     OS    Anticoagulant long-term use     B12 nutritional deficiency     Carotid stenosis     s/p R CEA 10/15; CTA 4/15 with 50-80% occlusion    Cataract     OU    Cervical stenosis of spinal canal     noted on CTA 3/15    Diastolic dysfunction     noted on 6/11 ECHO    DM (diabetes mellitus)     Episodic atrial fibrillation     x 2 during admit with urosepsis    Exotropia of both eyes     History of renal artery stenosis 09/2017    s/p angioplasty and stent    History of sepsis     urosepsis from pyelo and hydronephrosis    Hypercholesteremia     Hypertension     Hypothyroidism     Iliac artery stenosis, bilateral     noted on 6/12 USG    Mild pulmonary hypertension     8/12 ECHO    Monoclonal gammopathy     Osteopenia     dex 9/17    PAF (paroxysmal atrial fibrillation)     Ptosis of eyelid, left     S/P colonoscopy     5/14    Strabismus     OS    Subclavian artery stenosis, right     noted on 6/11 USG    Valvular heart disease     mod to severe AS noted 3/15 ECHO     Past Surgical History:   Procedure Laterality Date    CAROTID ENDARTERECTOMY      Right 15    COLON SURGERY  7/27/15    Colon Resection with colostomy    COLOSTOMY CLOSURE  1/22/16    HYSTERECTOMY  01/22/2016    with vaginal vault suspension       Current  Medications:   Current Outpatient Prescriptions:     amiodarone (PACERONE) 100 MG Tab, Take 1 tablet (100 mg total) by mouth once daily., Disp: 90 tablet, Rfl: 3    atorvastatin (LIPITOR) 40 MG tablet, TAKE 1 TABLET(40 MG) BY MOUTH EVERY EVENING, Disp: 90 tablet, Rfl: 1    blood sugar diagnostic Strp, To check BG once a daily, to use with insurance preferred meter, Disp: 100 strip, Rfl: 3    blood-glucose meter kit, As directed, Disp: 1 each, Rfl: 0    cholecalciferol, vitamin D3, (VITAMIN D3) 2,000 unit Cap, Take 1 capsule by mouth once daily., Disp: , Rfl:     clopidogrel (PLAVIX) 75 mg tablet, TAKE 1 TABLET(75 MG) BY MOUTH EVERY MORNING, Disp: 90 tablet, Rfl: 3    docusate sodium (COLACE) 100 MG capsule, Take 100 mg by mouth once daily., Disp: , Rfl:     ferrous sulfate 325 mg (65 mg iron) Tab tablet, Take 1 tablet (325 mg total) by mouth once daily., Disp: 90 tablet, Rfl: 1    lancets Misc, To check BG once a daily, to use with insurance preferred meter, Disp: 100 each, Rfl: 3    levothyroxine (SYNTHROID) 112 MCG tablet, TAKE 1 TABLET(112 MCG) BY MOUTH EVERY DAY, Disp: 90 tablet, Rfl: 3    lisinopril (PRINIVIL,ZESTRIL) 20 MG tablet, TAKE 1 TABLET(20 MG) BY MOUTH EVERY MORNING, Disp: 90 tablet, Rfl: 1    metoprolol tartrate (LOPRESSOR) 25 MG tablet, TAKE 1 TABLET(25 MG) BY MOUTH TWICE DAILY, Disp: 180 tablet, Rfl: 1    Family History: family history was reviewed and is noncontributory  Social History:   Social History     Social History    Marital status:      Spouse name: N/A    Number of children: N/A    Years of education: N/A     Occupational History    Not on file.     Social History Main Topics    Smoking status: Former Smoker     Packs/day: 2.00     Years: 50.00     Quit date: 5/18/2002    Smokeless tobacco: Never Used    Alcohol use No    Drug use: No    Sexual activity: No     Other Topics Concern    Not on file     Social History Narrative    No narrative on file  "      ROS:  Constitution: Negative for chills, fever, and sweats. Negative for unexplained weight loss.  HENT: Negative for headaches and blurry vision.   Cardiovascular: Negative for chest pain, irregular heartbeat, leg swelling and palpitations.   Respiratory: Negative for cough and shortness of breath.   Gastrointestinal: Negative for abdominal pain, heartburn, nausea and vomiting.   Genitourinary: Negative for bladder incontinence and dysuria.   Musculoskeletal: Negative for systemic arthritis, muscle weakness and myalgias.   Neurological: Negative for numbness.   Psychiatric/Behavioral: Negative for depression.  Endocrine: Negative for polyuria.   Hematologic/Lymphatic: Negative for bleeding disorders.   Skin: Negative for poor wound healing.        PHYSICAL EXAM:  Ht 5' 1" (1.549 m)   Wt 76.7 kg (169 lb 1.5 oz)   BMI 31.95 kg/m²   Linda Her is an elderly female in no acute distress. Physical examination of the left knee evaluated the following:    Gait and Alignment  Inspection for ecchymosis, swelling, atrophy, or deformity  Inspection for intra-articular and/or bursal effusions  Tenderness to palpation over the bony and soft tissue structures around the knee  Range of Motion and presence of extensor lag/contractures  Sensation and motor strength  Varus/valgus or anterior/posterior/rotatory instability  Flexion pinch and Deshawn's Tests  Patellar alignment/tracking/pain to palpation  Vascular exam to include skin temperature/color/capillary refill    Remarkable findings included:  No effusion noted.  Mild varus deformity.  Medial joint line tenderness present.  Range of motion 0-125° of flexion.  No instability on exam.  Positive flexion pinch.  Crepitus with range of motion of the patellofemoral joint.        IMAGING:    x-rays a left knee are personally reviewed and compared to the radiologist's report.  No acute fractures or dislocations are seen.  Significant degenerative changes with medial " compartment bone-on-bone narrowing identified     ASSESSMENT:    severe osteoarthrosis left knee    PLAN:  The nature of the diagnosis, using models and diagrams when appropriate, was explained to the patient in detail.Treatment option discussed inclueither managing the symptoms or treat the problem.  Treatment options discussed included observation, bracing, cortisone injection, and referral for arthroplasty.  . All questions answered and the patient wishes to proceed with a hinged knee brace.  This was provided fitted today.  She'll monitor her symptoms.  However should she continue to have significant discomfort, return for consideration of cortisone injection versus referral for arthroplasty.  Follow-up if not improving or worse. .      This note was dictated using voice recognition software. Please excuse any grammatical or typographical errors.

## 2017-11-21 ENCOUNTER — CLINICAL SUPPORT (OUTPATIENT)
Dept: REHABILITATION | Facility: HOSPITAL | Age: 77
End: 2017-11-21
Attending: FAMILY MEDICINE
Payer: MEDICARE

## 2017-11-21 DIAGNOSIS — R26.9 GAIT ABNORMALITY: ICD-10-CM

## 2017-11-21 PROCEDURE — 97110 THERAPEUTIC EXERCISES: CPT | Mod: PN

## 2017-11-21 NOTE — PROGRESS NOTES
"Name: Linda Her  Clinic Number: [id number]  Date of Treatment: 11/21/2017   Diagnosis: No diagnosis found.    Visit number: 2   Start date: 11/16/2017  POC End date: 01/12/2018    Time in: 4:58  Time Out: 5:42  Total Treatment Time: 40  1:1 Time: 40    Precautions: diabetic neuropathy, HTN, DM    Subjective:    Linda reports continued pain in the L knee. No complaints of low back pain this date. Patient reports their pain to be 6/10 on a 0-10 scale with 0 being no pain and 10 being the worst pain imaginable.    Objective    Linda received therapeutic exercises to develop strength, ROM, flexibility and posture for 40 minutes including:    Seated HS stretch 2x30" each  Standing glut squeeze 20 x 5" bilaterally, 10 x 5" with individual L and R  Supine quad set 20 x 5" each  Supine heel slides with slider 2x10  LTR x 2 min  PPT 15 x 5" + hip add squeeze 10 x 5" (manual cues required)  Supine SLR 2x10 each  SL clamshells 2x10 each      Written Home Exercises Provided: Pt instructed to continue with current written HEP, will progress next session.    Pt demo good understanding of the education provided. Linda demonstrated good return demonstration of activities.     Assessment:     Linda demonstrated overall good tolerance to treatment this date without onset of pain or soreness in the L knee or low back. Mild difficulty with TA activation with PPT as well as with hip add squeeze and required manual and verbal cues for technique. Improved upright posture in standing following standing glute squeezes. She continues with LE and core weakness and will benefit from continued strengthening as tolerated.  Pt will continue to benefit from skilled PT intervention. Medical Necessity is demonstrated by:  Pain limits function of effected part for some activities, Unable to participate fully in daily activities and Weakness.    Patient is making good progress towards established goals.    Short Term Goals:  4 weeks  1. Pt will " "present with increased left knee AROM to 115 degrees for mobility purposes.  2. Pt will present with increased LLE MMT by one half grade for increased ease with functional ADLs.  3. Pt will report decreased pain to </= 0/10  4. Pt will improve TUG without assistance < 13.5" for mobility purposes.     Long Term Goals: 8 weeks  1. Patient will return to community ambulation independently with LLE strength 4 or > for ADL purposes.  2. Patient will demonstrate chair rise testing up to 12 times for LE mobility purposes.  3. Pt will be independent with HEP and self management of symptoms.    Plan:  Continue with established Plan of Care towards PT goals.     "

## 2017-11-26 RX ORDER — CLOPIDOGREL BISULFATE 75 MG/1
TABLET ORAL
Qty: 90 TABLET | Refills: 0 | Status: SHIPPED | OUTPATIENT
Start: 2017-11-26

## 2017-11-26 RX ORDER — ATORVASTATIN CALCIUM 40 MG/1
TABLET, FILM COATED ORAL
Qty: 90 TABLET | Refills: 0 | Status: SHIPPED | OUTPATIENT
Start: 2017-11-26

## 2017-11-27 ENCOUNTER — CLINICAL SUPPORT (OUTPATIENT)
Dept: REHABILITATION | Facility: HOSPITAL | Age: 77
End: 2017-11-27
Attending: FAMILY MEDICINE
Payer: MEDICARE

## 2017-11-27 DIAGNOSIS — R26.9 GAIT ABNORMALITY: ICD-10-CM

## 2017-11-27 PROCEDURE — 97110 THERAPEUTIC EXERCISES: CPT | Mod: PN

## 2017-11-27 NOTE — PROGRESS NOTES
"Name: Linda Her  Clinic Number: [id number]  Date of Treatment: 11/27/2017   Diagnosis:   Encounter Diagnosis   Name Primary?    Gait abnormality        Visit number: 3   Start date: 11/16/2017  POC End date: 01/12/2018    Time in: 9:03  Time Out: 10:00  Total Treatment Time: 55  1:1 Time: 55    Precautions: diabetic neuropathy, HTN, DM    Subjective:    Linda reports continued pain in the L knee that occurs intermittently, mostly at night while trying to sleep. States her low back does not hurt. Patient reports their pain to be 5/10 on a 0-10 scale with 0 being no pain and 10 being the worst pain imaginable.    Objective    Linda received therapeutic exercises to develop strength, ROM, flexibility and posture for 55 minutes including:    Seated HS stretch 2x30" each  Standing glut squeeze 20 x 5" bilaterally, 15 x 5" with individual L and R  Supine quad set 20 x 5" each  Supine heel slides with slider 2x10  LTR x 2 min  Supine SLR 2x10 each  Deep diaphragmatic breathing  PPT 15 x 5" + hip add squeeze 15 x 5" (manual cues required)  SL clamshells 2x10 each    Written Home Exercises Provided: Pt given verbal and written instruction for additional SL clamshells, PPT, and LTR. See pt instructions in Notes tab.  Pt demo good understanding of the education provided. Linda demonstrated good return demonstration of activities.     Assessment:     Pt tolerated treatment well without report of L knee or low back pain. Muscle fatigue with heel slides and clamshells. Continued difficulty with TA activation with PPT and required manual cues for technique. Pt able to stand with proper upright posture with standing march using UE assist. L knee mobility limited due to Donjoy brace donned. Pt will benefit from continued strengthening as tolerated.  Pt will continue to benefit from skilled PT intervention. Medical Necessity is demonstrated by:  Pain limits function of effected part for some activities, Unable to participate " "fully in daily activities and Weakness.    Patient is making good progress towards established goals.    Short Term Goals:  4 weeks  1. Pt will present with increased left knee AROM to 115 degrees for mobility purposes.  2. Pt will present with increased LLE MMT by one half grade for increased ease with functional ADLs.  3. Pt will report decreased pain to </= 0/10  4. Pt will improve TUG without assistance < 13.5" for mobility purposes.     Long Term Goals: 8 weeks  1. Patient will return to community ambulation independently with LLE strength 4 or > for ADL purposes.  2. Patient will demonstrate chair rise testing up to 12 times for LE mobility purposes.  3. Pt will be independent with HEP and self management of symptoms.    Plan:  Continue with established Plan of Care towards PT goals.   "

## 2017-11-27 NOTE — PATIENT INSTRUCTIONS
Knee Roll        Lying on back, with knees bent and feet flat on floor, arms outstretched to sides, slowly roll both knees to side, hold 5 seconds. Back to starting position, hold 5 seconds. Then to opposite side, hold 5 seconds. Return to starting position. Keep shoulders and arms in contact with floor. Avoid painful motion.      Copyright © [employer]. All rights reserved.   Pelvic Tilt: Posterior - Legs Bent (Supine)        Tighten stomach and flatten back by rolling pelvis backward. Breathe out during this movement. Hold __3-5__ seconds. Relax.  Repeat _10__ times per set. Do __2__ sets per session. Do __1__ sessions per day.     [email or web address]     Copyright © [employer]. All rights reserved.   HIP: Abduction / External Rotation - Side-Lying        Lie on side with hip and knees bent. Tighten stomach muscles by pulling belly button back toward spine. Raise top knee up, squeezing glutes. Keep ankles together. __10_ reps per set, _2_ sets per day, _1__ time per day.      Copyright © [employer]. All rights reserved.

## 2017-11-29 ENCOUNTER — CLINICAL SUPPORT (OUTPATIENT)
Dept: REHABILITATION | Facility: HOSPITAL | Age: 77
End: 2017-11-29
Attending: FAMILY MEDICINE
Payer: MEDICARE

## 2017-11-29 DIAGNOSIS — R26.9 GAIT ABNORMALITY: ICD-10-CM

## 2017-11-29 PROCEDURE — 97110 THERAPEUTIC EXERCISES: CPT | Mod: PN | Performed by: PHYSICAL THERAPIST

## 2017-11-29 NOTE — PROGRESS NOTES
"Name: Linda Her  Clinic Number: [id number]  Date of Treatment: 11/29/2017   Diagnosis:   Encounter Diagnosis   Name Primary?    Gait abnormality        Visit number: 4  Start date: 11/16/2017  POC End date: 01/12/2018    Time in: 1000  Time Out: 1050  Total Treatment Time: 45      Precautions: diabetic neuropathy, HTN, DM    Subjective:    Linda reports feeling better from last session. No new s/s. No falls noted as well.  Patient reports their pain to be 0/10 on a 0-10 scale with 0 being no pain and 10 being the worst pain imaginable.    Objective    Linda received therapeutic exercises to develop strength, ROM, flexibility and posture for 45 minutes including:(30 minutes one on one)    Seated HS stretch 3x30" each  Standing glut squeeze 20 x 5" bilaterally, 15 x 5" with individual L and R  Seated: LAQ 2# 3/10 each  Supine quad set 20 x 5" each  Supine heel slides with slider 2x10  LTR x 2 min  Supine SLR 3x10 each  Deep diaphragmatic breathing (previous)  PPT 15 x 5" + hip add squeeze 15 x 5" (manual cues required)  SL clamshells 2x10 each with YTB 3/10    Written Home Exercises Provided: Pt given verbal and written instruction for additional SL clamshells, PPT, and LTR. See pt instructions in Notes tab.  Pt demo good understanding of the education provided. Linda demonstrated good return demonstration of activities.     Assessment:   - breaks noted due to energy conservation purposes.  - weakness of the left quad, glut medius as well  -gait abnormality    Pt will continue to benefit from skilled PT intervention. Medical Necessity is demonstrated by:  Pain limits function of effected part for some activities, Unable to participate fully in daily activities and Weakness.    Patient is making good progress towards established goals.    Short Term Goals:  4 weeks  1. Pt will present with increased left knee AROM to 115 degrees for mobility purposes.  2. Pt will present with increased LLE MMT by one half grade for " "increased ease with functional ADLs.  3. Pt will report decreased pain to </= 0/10  4. Pt will improve TUG without assistance < 13.5" for mobility purposes.     Long Term Goals: 8 weeks  1. Patient will return to community ambulation independently with LLE strength 4 or > for ADL purposes.  2. Patient will demonstrate chair rise testing up to 12 times for LE mobility purposes.  3. Pt will be independent with HEP and self management of symptoms.    Plan:  Continue with established Plan of Care towards PT goals.   "

## 2017-12-04 ENCOUNTER — CLINICAL SUPPORT (OUTPATIENT)
Dept: REHABILITATION | Facility: HOSPITAL | Age: 77
End: 2017-12-04
Attending: FAMILY MEDICINE
Payer: MEDICARE

## 2017-12-04 DIAGNOSIS — R26.9 GAIT ABNORMALITY: ICD-10-CM

## 2017-12-04 PROCEDURE — 97110 THERAPEUTIC EXERCISES: CPT | Mod: PN

## 2017-12-04 NOTE — PROGRESS NOTES
"Name: Linda Her  Clinic Number: [id number]  Date of Treatment: 12/04/2017   Diagnosis:   Encounter Diagnosis   Name Primary?    Gait abnormality        Visit number: 5  Start date: 11/16/2017  POC End date: 01/12/2018    Time in: 2:05  Time Out: 2:55  Total Treatment Time: 50      Precautions: diabetic neuropathy, HTN, DM    Subjective:    Linda reports overall improvement of low back pain, but has continued symptoms into the LEs. Occasional numbness and aching. Patient reports their current pain to be 0/10 on a 0-10 scale with 0 being no pain and 10 being the worst pain imaginable.    Objective    Linda received therapeutic exercises to develop strength, ROM, flexibility and posture for 50 minutes including:    Seated HS stretch 3x30" each  Standing glut squeeze 20 x 5" bilaterally, 15 x 5" with individual L and R  Seated: LAQ 2# 3/10 each  Supine quad set 20 x 5" each  Supine heel slides with slider 2x10  LTR x 2 min  Supine SLR 3x10 each  Supine bridge 2x10x3"  Deep diaphragmatic breathing (previous)  PPT 15 x 5" + hip add squeeze 20 x 5" (manual cues required)  SL clamshells with YTB 3/10 each  Sidesteps along orange/gray walkway 15" x 1 lap    Written Home Exercises Provided: Pt instructed to continue with current written HEP.  Pt demo good understanding of the education provided. Linda demonstrated good return demonstration of activities.     Assessment:   Overall good tolerance to treatment this date without onset of soreness or pain. Muscle fatigue reported with SL clamshells as well as sidesteps along walkway. Rest breaks with several exercises due to fatigue yet strength is slowly improving. Antalgic gait continues due to weakness of the quad and L glute med. Pt will benefit from continued strengthening.    Pt will continue to benefit from skilled PT intervention. Medical Necessity is demonstrated by:  Pain limits function of effected part for some activities, Unable to participate fully in daily " "activities and Weakness.    Patient is making good progress towards established goals.    Short Term Goals:  4 weeks  1. Pt will present with increased left knee AROM to 115 degrees for mobility purposes.  2. Pt will present with increased LLE MMT by one half grade for increased ease with functional ADLs.  3. Pt will report decreased pain to </= 0/10  4. Pt will improve TUG without assistance < 13.5" for mobility purposes.     Long Term Goals: 8 weeks  1. Patient will return to community ambulation independently with LLE strength 4 or > for ADL purposes.  2. Patient will demonstrate chair rise testing up to 12 times for LE mobility purposes.  3. Pt will be independent with HEP and self management of symptoms.    Plan:  Continue with established Plan of Care towards PT goals.   "

## 2017-12-07 ENCOUNTER — TELEPHONE (OUTPATIENT)
Dept: FAMILY MEDICINE | Facility: CLINIC | Age: 77
End: 2017-12-07

## 2017-12-11 ENCOUNTER — CLINICAL SUPPORT (OUTPATIENT)
Dept: REHABILITATION | Facility: HOSPITAL | Age: 77
End: 2017-12-11
Attending: FAMILY MEDICINE
Payer: MEDICARE

## 2017-12-11 DIAGNOSIS — R26.9 GAIT ABNORMALITY: ICD-10-CM

## 2017-12-11 PROCEDURE — 97110 THERAPEUTIC EXERCISES: CPT | Mod: PN | Performed by: PHYSICAL THERAPIST

## 2017-12-11 NOTE — PROGRESS NOTES
Name: Linda Her  Clinic Number: [id number]  Date of Treatment: 12/11/2017   Diagnosis:   Encounter Diagnosis   Name Primary?    Gait abnormality        Visit number: 6  Start date: 11/16/2017  POC End date: 01/12/2018    Time in: 1300  Time Out: 1400  Total Treatment Time: 55    Precautions: diabetic neuropathy, HTN, DM    Subjective:    Linda reports overall improvement with no pain noted. Patient reports their current pain to be 0/10 on a 0-10 scale with 0 being no pain and 10 being the worst pain imaginable.    Objective    Linda received therapeutic exercises to develop strength, ROM, flexibility and posture for 55 minutes including:     Vitals:  HR 95 bpm  O2- 98%  BP: 170/100     -bike x 5 minutes, level 2.0 (no distress noted)    -sit to stands: 5 with tactile input for body mechanics. (break afterwards)    Written Home Exercises Provided: Pt instructed to continue with current written HEP.  Pt demo good understanding of the education provided. Linda demonstrated good return demonstration of activities.     Assessment:   Patient was given rest break after performing 5 sit to stands working on body mechanics. Tactile input for upright posture with forward lean as well.   Patient able to communicate during rest break prior to s/,s and was asking to have all of our names written on a piece of paper for a [employer] card.  After speaking to her, Mrs. Mckeon appeared lethargic with report on having instant head-ache. Vitals were taken. Please see above. Slurred speech, drooling out of the mouth were the next symptoms. My supervisor was notified and called 911. Patient then was unable to elevate left arm upon command but did lift right arm. There was 5-7 minutes noted prior to EMS arrival, and she became non-verbal at that point. Referring physician was notified. Patient was taken to [facility].    Pt will continue to benefit from skilled PT intervention. Medical Necessity is demonstrated by:  Pain limits  "function of effected part for some activities, Unable to participate fully in daily activities and Weakness.    Patient is making good progress towards established goals.    Short Term Goals:  4 weeks  1. Pt will present with increased left knee AROM to 115 degrees for mobility purposes.  2. Pt will present with increased LLE MMT by one half grade for increased ease with functional ADLs.  3. Pt will report decreased pain to </= 0/10  4. Pt will improve TUG without assistance < 13.5" for mobility purposes.     Long Term Goals: 8 weeks  1. Patient will return to community ambulation independently with LLE strength 4 or > for ADL purposes.  2. Patient will demonstrate chair rise testing up to 12 times for LE mobility purposes.  3. Pt will be independent with HEP and self management of symptoms.    Plan:  Continue with established Plan of Care towards PT goals.   "

## 2017-12-12 ENCOUNTER — TELEPHONE (OUTPATIENT)
Dept: HEMATOLOGY/ONCOLOGY | Facility: CLINIC | Age: 77
End: 2017-12-12

## 2017-12-12 ENCOUNTER — DOCUMENTATION ONLY (OUTPATIENT)
Dept: REHABILITATION | Facility: HOSPITAL | Age: 77
End: 2017-12-12

## 2017-12-12 ENCOUNTER — TELEPHONE (OUTPATIENT)
Dept: NEPHROLOGY | Facility: CLINIC | Age: 77
End: 2017-12-12

## 2017-12-12 ENCOUNTER — TELEPHONE (OUTPATIENT)
Dept: FAMILY MEDICINE | Facility: CLINIC | Age: 77
End: 2017-12-12

## 2017-12-12 PROBLEM — R26.9 GAIT ABNORMALITY: Status: RESOLVED | Noted: 2017-11-16 | Resolved: 2017-12-12

## 2017-12-12 NOTE — TELEPHONE ENCOUNTER
----- Message from Sarah Del Valle sent at 12/12/2017 10:24 AM CST -----  Contact: daughter in lawjim Alvarez   Daughter in Law Kim wants to let office know that patient just passed away and he needs to cancel all her appts she has and wants to tell Dr how much they appreciate all they have done for her

## 2017-12-12 NOTE — PROGRESS NOTES
Name: Linda Her  [facility] Number: [id number]  Date of Treatment: 2017   Diagnosis: No diagnosis found.      Patient discharged due to change in medical status, () as of 2017.

## 2017-12-12 NOTE — TELEPHONE ENCOUNTER
----- Message from Sarah Del Valle sent at 12/12/2017 10:26 AM CST -----  Contact: daughter in law   Daughter in Law Kim wants to let office know that patient just passed away and he needs to cancel all her appts she has

## 2017-12-12 NOTE — TELEPHONE ENCOUNTER
----- Message from Sarah Del Valle sent at 12/12/2017 10:27 AM CST -----  Contact: Daughter in law   Daughter in Law Kim wants to let office know that patient just passed away and he needs to cancel all her appts she has